# Patient Record
Sex: FEMALE | Race: BLACK OR AFRICAN AMERICAN | NOT HISPANIC OR LATINO | Employment: OTHER | URBAN - METROPOLITAN AREA
[De-identification: names, ages, dates, MRNs, and addresses within clinical notes are randomized per-mention and may not be internally consistent; named-entity substitution may affect disease eponyms.]

---

## 2018-11-27 DIAGNOSIS — R09.02 HYPOXEMIA: Primary | ICD-10-CM

## 2018-11-28 ENCOUNTER — HOSPITAL ENCOUNTER (OUTPATIENT)
Dept: RADIOLOGY | Facility: HOSPITAL | Age: 83
Discharge: HOME | End: 2018-11-28
Attending: INTERNAL MEDICINE
Payer: MEDICARE

## 2018-11-28 DIAGNOSIS — R09.02 HYPOXEMIA: ICD-10-CM

## 2018-11-28 DIAGNOSIS — R41.82 ALTERED MENTAL STATUS: Primary | ICD-10-CM

## 2018-11-28 PROCEDURE — 71046 X-RAY EXAM CHEST 2 VIEWS: CPT | Mod: 26,,, | Performed by: RADIOLOGY

## 2018-11-28 PROCEDURE — 71046 X-RAY EXAM CHEST 2 VIEWS: CPT | Mod: TC,PO

## 2018-11-30 ENCOUNTER — HOSPITAL ENCOUNTER (EMERGENCY)
Facility: HOSPITAL | Age: 83
Discharge: ANOTHER HOSPITAL | End: 2018-11-30
Attending: EMERGENCY MEDICINE
Payer: MEDICARE

## 2018-11-30 VITALS
HEART RATE: 68 BPM | DIASTOLIC BLOOD PRESSURE: 89 MMHG | WEIGHT: 203.94 LBS | RESPIRATION RATE: 20 BRPM | OXYGEN SATURATION: 100 % | SYSTOLIC BLOOD PRESSURE: 143 MMHG | TEMPERATURE: 98 F

## 2018-11-30 DIAGNOSIS — J81.0 ACUTE PULMONARY EDEMA: ICD-10-CM

## 2018-11-30 DIAGNOSIS — Z79.01 ANTICOAGULATED: ICD-10-CM

## 2018-11-30 DIAGNOSIS — E87.20 LACTIC ACIDOSIS: ICD-10-CM

## 2018-11-30 DIAGNOSIS — I50.9: Primary | ICD-10-CM

## 2018-11-30 DIAGNOSIS — Z95.2 HISTORY OF HEART VALVE REPLACEMENT WITH MECHANICAL VALVE: ICD-10-CM

## 2018-11-30 DIAGNOSIS — R41.82 ALTERED MENTAL STATUS, UNSPECIFIED ALTERED MENTAL STATUS TYPE: ICD-10-CM

## 2018-11-30 DIAGNOSIS — N17.9 AKI (ACUTE KIDNEY INJURY): ICD-10-CM

## 2018-11-30 LAB
ALBUMIN SERPL BCP-MCNC: 3.8 G/DL
ALP SERPL-CCNC: 57 U/L
ALT SERPL W/O P-5'-P-CCNC: 40 U/L
AMPHET+METHAMPHET UR QL: NEGATIVE
ANION GAP SERPL CALC-SCNC: 11 MMOL/L
AST SERPL-CCNC: 37 U/L
BACTERIA #/AREA URNS AUTO: NORMAL /HPF
BARBITURATES UR QL SCN>200 NG/ML: NEGATIVE
BASOPHILS # BLD AUTO: 0.01 K/UL
BASOPHILS NFR BLD: 0.2 %
BENZODIAZ UR QL SCN>200 NG/ML: NEGATIVE
BILIRUB SERPL-MCNC: 0.5 MG/DL
BILIRUB UR QL STRIP: NEGATIVE
BNP SERPL-MCNC: 1667 PG/ML
BUN SERPL-MCNC: 32 MG/DL
BZE UR QL SCN: NEGATIVE
CALCIUM SERPL-MCNC: 9.5 MG/DL
CANNABINOIDS UR QL SCN: NEGATIVE
CHLORIDE SERPL-SCNC: 102 MMOL/L
CLARITY UR REFRACT.AUTO: CLEAR
CO2 SERPL-SCNC: 20 MMOL/L
COLOR UR AUTO: YELLOW
CREAT SERPL-MCNC: 1.9 MG/DL
CREAT UR-MCNC: 187.8 MG/DL
DIFFERENTIAL METHOD: ABNORMAL
EOSINOPHIL # BLD AUTO: 0.1 K/UL
EOSINOPHIL NFR BLD: 1.1 %
ERYTHROCYTE [DISTWIDTH] IN BLOOD BY AUTOMATED COUNT: 18.2 %
EST. GFR  (AFRICAN AMERICAN): 26.4 ML/MIN/1.73 M^2
EST. GFR  (NON AFRICAN AMERICAN): 22.9 ML/MIN/1.73 M^2
ETHANOL SERPL-MCNC: <10 MG/DL
GLUCOSE SERPL-MCNC: 97 MG/DL
GLUCOSE UR QL STRIP: NEGATIVE
HCT VFR BLD AUTO: 33.8 %
HGB BLD-MCNC: 10.9 G/DL
HGB UR QL STRIP: NEGATIVE
HYALINE CASTS UR QL AUTO: 0 /LPF
INR PPP: 2.9
KETONES UR QL STRIP: ABNORMAL
LACTATE SERPL-SCNC: 2.4 MMOL/L
LEUKOCYTE ESTERASE UR QL STRIP: NEGATIVE
LYMPHOCYTES # BLD AUTO: 0.9 K/UL
LYMPHOCYTES NFR BLD: 15.3 %
MCH RBC QN AUTO: 28.8 PG
MCHC RBC AUTO-ENTMCNC: 32.2 G/DL
MCV RBC AUTO: 89 FL
METHADONE UR QL SCN>300 NG/ML: NEGATIVE
MICROSCOPIC COMMENT: NORMAL
MONOCYTES # BLD AUTO: 0.5 K/UL
MONOCYTES NFR BLD: 9.1 %
NEUTROPHILS # BLD AUTO: 4.2 K/UL
NEUTROPHILS NFR BLD: 74.1 %
NITRITE UR QL STRIP: NEGATIVE
OPIATES UR QL SCN: NEGATIVE
PCP UR QL SCN>25 NG/ML: NEGATIVE
PH UR STRIP: 6 [PH] (ref 5–8)
PLATELET # BLD AUTO: 227 K/UL
PMV BLD AUTO: 11.4 FL
POTASSIUM SERPL-SCNC: 4.3 MMOL/L
PROT SERPL-MCNC: 8 G/DL
PROT UR QL STRIP: ABNORMAL
PROTHROMBIN TIME: 29.4 SEC
RBC # BLD AUTO: 3.79 M/UL
RBC #/AREA URNS AUTO: 3 /HPF (ref 0–4)
SODIUM SERPL-SCNC: 133 MMOL/L
SP GR UR STRIP: >=1.03 (ref 1–1.03)
SQUAMOUS #/AREA URNS AUTO: 0 /HPF
TOXICOLOGY INFORMATION: NORMAL
TROPONIN I SERPL DL<=0.01 NG/ML-MCNC: 0.07 NG/ML
URN SPEC COLLECT METH UR: ABNORMAL
UROBILINOGEN UR STRIP-ACNC: <2 EU/DL
WBC # BLD AUTO: 5.69 K/UL
WBC #/AREA URNS AUTO: 0 /HPF (ref 0–5)

## 2018-11-30 PROCEDURE — 83605 ASSAY OF LACTIC ACID: CPT

## 2018-11-30 PROCEDURE — 80307 DRUG TEST PRSMV CHEM ANLYZR: CPT

## 2018-11-30 PROCEDURE — 87040 BLOOD CULTURE FOR BACTERIA: CPT

## 2018-11-30 PROCEDURE — 99900035 HC TECH TIME PER 15 MIN (STAT)

## 2018-11-30 PROCEDURE — 85610 PROTHROMBIN TIME: CPT

## 2018-11-30 PROCEDURE — 80053 COMPREHEN METABOLIC PANEL: CPT

## 2018-11-30 PROCEDURE — 85025 COMPLETE CBC W/AUTO DIFF WBC: CPT

## 2018-11-30 PROCEDURE — 93010 ELECTROCARDIOGRAM REPORT: CPT | Mod: ,,, | Performed by: NUCLEAR MEDICINE

## 2018-11-30 PROCEDURE — 93005 ELECTROCARDIOGRAM TRACING: CPT

## 2018-11-30 PROCEDURE — 83880 ASSAY OF NATRIURETIC PEPTIDE: CPT

## 2018-11-30 PROCEDURE — 81000 URINALYSIS NONAUTO W/SCOPE: CPT | Mod: 59

## 2018-11-30 PROCEDURE — 84484 ASSAY OF TROPONIN QUANT: CPT

## 2018-11-30 PROCEDURE — 80320 DRUG SCREEN QUANTALCOHOLS: CPT

## 2018-11-30 PROCEDURE — 99285 EMERGENCY DEPT VISIT HI MDM: CPT | Mod: 25

## 2018-11-30 RX ORDER — NITROGLYCERIN 0.4 MG/1
0.4 TABLET SUBLINGUAL EVERY 5 MIN PRN
COMMUNITY
Start: 2017-03-28

## 2018-11-30 RX ORDER — TRAMADOL HYDROCHLORIDE 50 MG/1
50 TABLET ORAL 2 TIMES DAILY PRN
Refills: 2 | COMMUNITY
Start: 2018-10-30

## 2018-12-01 NOTE — ED NOTES
"Pt is alert, denies pain anywhere & states "Im so weak", family reports decreased oral intake over past week or so  "

## 2018-12-01 NOTE — ED NOTES
Pt resting quietly in bed, she is awake, oriented to person & place , recognizes family members but is confused when asked direct questions such as what she ate today, or day of the week.  Family states pt has become more disoriented over past few days, has had an adequate appetite, but  not drinking well.  Also daughter states shes only had damp pull ups or pads 2-3 times a day over the past 4 days.   BM yesterday. Resp unlabored, rales auscultated at bases.  Daughter states that pt has been getting more SOB with exertion recently  2+ pitting edema to lower ext, feet blue. positive distal pulses.   Family states the swelling to lower legs is much better tonight than it has been.  Will continue to monitor, family at bedside

## 2018-12-01 NOTE — ED NOTES
SOB/swelling/AMS for 2 weeks, worse over past 2 days. Seen by PCP unable to obtain blood d/t dehydration. CXR clear per family    Level of Consciousness: Patient is awake, alert, and oriented to person, place, time, and situation. Speech is clear.   HEENT: Symmetrical face, PERRLA, dry mucous membranes, no congestion/drainage, no JVD, pt able to swallow   Appearance: Patient resting comfortably in bed, hygiene and clothing are both intact and appropriate.   Skin: Skin is warm, dry, and intact. darkening of skin to BLE  Musculoskeletal: Moves all extremities. Full active ROM. No deformities noted. Generalized weakness/fatigue. Pt uses walker at home but presents in wheelchair, unable to stand on own, moves to bed with full assist.  Respiratory: Airway open and patent. Resp labored with any exertion. Crackles noted to bilateral posterior lower lobes.   Cardiac: Regular rate and rhythm. Severe pitting edema noted to BLE. Pedal pulses weak.    GI: Abdomen soft, non-tender. Bowel sounds present and active in all quadrants x 4. No distention noted. Denies any nausea or vomiting.   : oliguria per pt family  Neurological: Normal sensation reported to all extremities. Symmetrical expressions noted to face. No obvious neurological deficits noted.   Psychosocial: Patient is calm and cooperative, appropriate to situation. Family is involved in patient care and at bedside.     Patient informed of plan of care, verbalizes understanding, and has no questions or concern at this time. Bed is in the lowest position and locked. Call bell within reach of the patient. Will continue to monitor.

## 2018-12-01 NOTE — ED NOTES
Per C, Patient accepted by Dr. Hyde at [facility]. [facility] setting up [facility] transport.

## 2018-12-01 NOTE — ED PROVIDER NOTES
Encounter Date: 2018       History     Chief Complaint   Patient presents with    Altered Mental Status     Onset approx 2 weeks. Worsening for past 3 days. Family reports SOB and bilateral leg swelling.      Patient currently presents accompanied by family with concerns regarding worsening alteration of mental status.  They note that the patient has had some intermittent confusion over the past 2 weeks but beginning earlier this week has gotten much worse.  The patient initially presented to her primary care physician on Monday but notes that they were unable to acquire blood for lab work.  They note that the patient was sent to a separate facility on Tuesday but again were unable to acquire labs.  They note that the patient has been fairly withdrawn and has been increasingly confused.  They note that the patient has been discussing family members that have been  for years.  They deny fever or chills. They have noted that the patient appears to be short of breath and also has significant swelling in the bilateral lower extremities along with some discoloration in the feet.  Patient has a longstanding history of diabetes and hypertension as well as CHF.  She has a history of a mechanical valve replacement for which she is currently anticoagulated.  It was also noted earlier this week that the patient was in newly acquired atrial fibrillation.          Review of patient's allergies indicates:  No Known Allergies  Past Medical History:   Diagnosis Date    CHF (congestive heart failure)     Diabetes mellitus     Hypertension     Thyroid disease      Past Surgical History:   Procedure Laterality Date    CARDIAC VALVE REPLACEMENT      HERNIA REPAIR       History reviewed. No pertinent family history.  Social History     Tobacco Use    Smoking status: Never Smoker    Smokeless tobacco: Never Used   Substance Use Topics    Alcohol use: No    Drug use: No     Review of Systems   Constitutional:  Positive for fatigue. Negative for chills and fever.   HENT: Negative for congestion and rhinorrhea.    Respiratory: Positive for cough, shortness of breath and wheezing. Negative for chest tightness.    Cardiovascular: Positive for leg swelling. Negative for chest pain and palpitations.   Gastrointestinal: Negative for abdominal pain, constipation, diarrhea, nausea and vomiting.   Genitourinary: Negative for dysuria, frequency, urgency, vaginal bleeding and vaginal discharge.   Skin: Negative for color change and rash.   Allergic/Immunologic: Negative for immunocompromised state.   Neurological: Negative for dizziness, weakness and numbness.   Hematological: Negative for adenopathy. Does not bruise/bleed easily.   Psychiatric/Behavioral: Positive for confusion.   All other systems reviewed and are negative.      Physical Exam     Initial Vitals [11/30/18 1752]   BP Pulse Resp Temp SpO2   (!) 164/79 77 20 98 °F (36.7 °C) 96 %      MAP       --         Vitals:    11/30/18 1752 11/30/18 1815 11/30/18 1926 11/30/18 1946   BP: (!) 164/79  129/89 (!) 154/81   Pulse: 77 63 64 63   Resp: 20  (!) 28 (!) 25   Temp: 98 °F (36.7 °C)      TempSrc: Oral      SpO2: 96%   (!) 94%   Weight: 92.5 kg (203 lb 14.8 oz)       11/30/18 2016   BP: (!) 146/92   Pulse: 62   Resp: (!) 24   Temp:    TempSrc:    SpO2:    Weight:      Physical Exam    Nursing note and vitals reviewed.  Constitutional: She appears well-developed and well-nourished. She is not diaphoretic. No distress.   HENT:   Head: Normocephalic and atraumatic.   Right Ear: External ear normal.   Left Ear: External ear normal.   Nose: Nose normal.   Mouth/Throat: Oropharynx is clear and moist.   Eyes: Conjunctivae and EOM are normal. Pupils are equal, round, and reactive to light. No scleral icterus.   Neck: Neck supple. No tracheal deviation present. No JVD present.   Cardiovascular: Normal rate, regular rhythm, normal heart sounds and intact distal pulses. Exam reveals no  gallop and no friction rub.    No murmur heard.  Pulmonary/Chest: No accessory muscle usage. Tachypnea noted. No respiratory distress. She has wheezes. She has no rhonchi. She has rales.   Abdominal: Soft. Bowel sounds are normal. She exhibits no distension. There is no tenderness.   Musculoskeletal: Normal range of motion. She exhibits edema.   Bluish discoloration noted to the distal feet bilaterally. There is also marked bilateral lower extremity swelling with pitting edema.   Neurological: She is alert and oriented to person, place, and time. She has normal strength. No cranial nerve deficit or sensory deficit.   Skin: Skin is warm and dry. No rash noted.   Psychiatric: She has a normal mood and affect. Her behavior is normal.       ED Course   Procedures  Labs Reviewed   CBC W/ AUTO DIFFERENTIAL - Abnormal; Notable for the following components:       Result Value    RBC 3.79 (*)     Hemoglobin 10.9 (*)     Hematocrit 33.8 (*)     RDW 18.2 (*)     Lymph # 0.9 (*)     Gran% 74.1 (*)     Lymph% 15.3 (*)     All other components within normal limits   COMPREHENSIVE METABOLIC PANEL - Abnormal; Notable for the following components:    Sodium 133 (*)     CO2 20 (*)     BUN, Bld 32 (*)     Creatinine 1.9 (*)     eGFR if  26.4 (*)     eGFR if non  22.9 (*)     All other components within normal limits   TROPONIN I - Abnormal; Notable for the following components:    Troponin I 0.067 (*)     All other components within normal limits   URINALYSIS, REFLEX TO URINE CULTURE - Abnormal; Notable for the following components:    Specific Gravity, UA >=1.030 (*)     Protein, UA 2+ (*)     Ketones, UA Trace (*)     All other components within normal limits    Narrative:     Preferred Collection Type->Urine, Clean Catch   LACTIC ACID, PLASMA - Abnormal; Notable for the following components:    Lactate (Lactic Acid) 2.4 (*)     All other components within normal limits   B-TYPE NATRIURETIC PEPTIDE -  Abnormal; Notable for the following components:    BNP 1,667 (*)     All other components within normal limits   CULTURE, BLOOD   ALCOHOL,MEDICAL (ETHANOL)   DRUG SCREEN PANEL, URINE EMERGENCY    Narrative:     Preferred Collection Type->Urine, Clean Catch   URINALYSIS MICROSCOPIC    Narrative:     Preferred Collection Type->Urine, Clean Catch   PROTIME-INR     EKG Readings: (Independently Interpreted)   Initial Reading: No STEMI. Rhythm: Atrial Fibrillation. Heart Rate: 72. Ectopy: No Ectopy. Axis: Left Axis Deviation.       Imaging Results          CT Head Without Contrast (Final result)  Result time 11/30/18 19:14:45    Final result by Ravi Holley III, MD (11/30/18 19:14:45)                 Impression:      Scan degraded by motion.  Atrophy with bilateral white-matter changes and scattered lacuna which may be related to microvascular disease.  No bleed or other gross acute abnormality suggested.    All CT scans at this facility are performed  using dose modulation techniques as appropriate to performed exam including the following:  automated exposure control; adjustment of mA and/or kV according to the patients size (this includes techniques or standardized protocols for targeted exams where dose is matched to indication/reason for exam: i.e. extremities or head);  iterative reconstruction technique.      Electronically signed by: Ravi Holley MD  Date:    11/30/2018  Time:    19:14             Narrative:    EXAMINATION:  CT HEAD WITHOUT CONTRAST    CLINICAL HISTORY:  Confusion/delirium, altered LOC, unexplained;    TECHNIQUE:  Standard non contrast CT scan of the brain.    COMPARISON:  None    FINDINGS:  There are moderate changes of atrophy, both supra and infratentorial.  Changes of low attenuation are noted in the periventricular and subcortical white matter with scattered lacuna.  Exam is mildly degraded by motion artifact.  There is no hemorrhage, mass lesion, hydrocephalus, or midline shift.   No acute/depressed skull fracture.  Mucosal thickening noted within the left maxillary antrum.                               X-Ray Chest AP Portable (Final result)  Result time 11/30/18 19:11:57    Final result by Ravi Holley III, MD (11/30/18 19:11:57)                 Impression:      Postop changes with moderate cardiomegaly.  Cannot exclude pleural thickening versus loculated fluid laterally toward the right base.  Follow-up recommended      Electronically signed by: Ravi Holley MD  Date:    11/30/2018  Time:    19:11             Narrative:    EXAMINATION:  XR CHEST AP PORTABLE    CLINICAL HISTORY:  AMS;    COMPARISON:  November 28th    FINDINGS:  Heart size is moderately enlarged with postoperative changes along the sternum.  I question slight thickening along the lower lateral chest wall on the right which may be due to a small fluid collection.  Lungs otherwise clear.                                 Medical Decision Making:   ED Management:  All historical, clinical, radiographic, and laboratory findings were reviewed with the patient/family in detail along with the indications for transfer to an outside facility (rather than admission to our facility in [place]) secondary to patient preference for continuity of care with Dr Pizarro and a need for cardiology consultation and echo given the diagnosis of low output failure.  All remaining questions and concerns were addressed at that time and the patient/family communicates understanding and agrees to proceed accordingly.  Similarly all pertinent details of the encounter were discussed with Dr Hyde at [facility] ED via [facility] who agrees to accept the patient in transfer based on the needs/patient preferences outlined above.  Patient will be transferred by [facility] secondary to a need for ongoing cardiac monitoring en route.  Jaquan Min MD  8:58 PM                          Clinical Impression:   The primary encounter  diagnosis was Low output HF (heart failure). Diagnoses of Acute pulmonary edema, JET (acute kidney injury), Altered mental status, unspecified altered mental status type, History of heart valve replacement with mechanical valve, Anticoagulated, and Lactic acidosis were also pertinent to this visit.                             Jaquan Min MD  11/30/18 0510

## 2018-12-06 LAB — BACTERIA BLD CULT: NORMAL

## 2019-02-16 ENCOUNTER — HOSPITAL ENCOUNTER (EMERGENCY)
Facility: HOSPITAL | Age: 84
Discharge: HOME | End: 2019-02-17
Attending: EMERGENCY MEDICINE
Payer: MEDICARE

## 2019-02-16 DIAGNOSIS — N28.9 RENAL DYSFUNCTION: Primary | ICD-10-CM

## 2019-02-16 DIAGNOSIS — N39.0 URINARY TRACT INFECTION WITHOUT HEMATURIA, SITE UNSPECIFIED: ICD-10-CM

## 2019-02-16 DIAGNOSIS — R41.0 CONFUSION: ICD-10-CM

## 2019-02-16 DIAGNOSIS — D64.9 ANEMIA, UNSPECIFIED TYPE: ICD-10-CM

## 2019-02-16 PROCEDURE — 99900035 HC TECH TIME PER 15 MIN (STAT): Mod: ER

## 2019-02-16 PROCEDURE — 99285 EMERGENCY DEPT VISIT HI MDM: CPT | Mod: 25,ER

## 2019-02-16 PROCEDURE — 93010 EKG 12-LEAD: ICD-10-PCS | Mod: ,,, | Performed by: NUCLEAR MEDICINE

## 2019-02-16 PROCEDURE — 93005 ELECTROCARDIOGRAM TRACING: CPT | Mod: ER

## 2019-02-16 PROCEDURE — 93010 ELECTROCARDIOGRAM REPORT: CPT | Mod: ,,, | Performed by: NUCLEAR MEDICINE

## 2019-02-16 RX ORDER — FUROSEMIDE 40 MG/1
40 TABLET ORAL 2 TIMES DAILY
COMMUNITY

## 2019-02-17 VITALS
OXYGEN SATURATION: 97 % | DIASTOLIC BLOOD PRESSURE: 77 MMHG | TEMPERATURE: 97 F | HEART RATE: 69 BPM | RESPIRATION RATE: 20 BRPM | SYSTOLIC BLOOD PRESSURE: 147 MMHG

## 2019-02-17 PROBLEM — N39.0 URINARY TRACT INFECTION WITHOUT HEMATURIA: Status: ACTIVE | Noted: 2019-02-17

## 2019-02-17 PROBLEM — R41.0 CONFUSION: Status: ACTIVE | Noted: 2019-02-17

## 2019-02-17 PROBLEM — N28.9 RENAL DYSFUNCTION: Status: ACTIVE | Noted: 2019-02-17

## 2019-02-17 LAB
ALBUMIN SERPL BCP-MCNC: 3.1 G/DL
ALP SERPL-CCNC: 83 U/L
ALT SERPL W/O P-5'-P-CCNC: 19 U/L
AMPHET+METHAMPHET UR QL: NEGATIVE
ANION GAP SERPL CALC-SCNC: 12 MMOL/L
AST SERPL-CCNC: 29 U/L
BACTERIA #/AREA URNS AUTO: ABNORMAL /HPF
BARBITURATES UR QL SCN>200 NG/ML: NEGATIVE
BASOPHILS # BLD AUTO: 0.02 K/UL
BASOPHILS NFR BLD: 0.4 %
BENZODIAZ UR QL SCN>200 NG/ML: NEGATIVE
BILIRUB SERPL-MCNC: 0.9 MG/DL
BILIRUB UR QL STRIP: NEGATIVE
BUN SERPL-MCNC: 38 MG/DL
BZE UR QL SCN: NEGATIVE
CALCIUM SERPL-MCNC: 9.2 MG/DL
CANNABINOIDS UR QL SCN: NEGATIVE
CHLORIDE SERPL-SCNC: 104 MMOL/L
CLARITY UR REFRACT.AUTO: CLEAR
CO2 SERPL-SCNC: 22 MMOL/L
COLOR UR AUTO: ABNORMAL
CREAT SERPL-MCNC: 1.8 MG/DL
CREAT UR-MCNC: 81.5 MG/DL
DIFFERENTIAL METHOD: ABNORMAL
EOSINOPHIL # BLD AUTO: 0 K/UL
EOSINOPHIL NFR BLD: 0.2 %
ERYTHROCYTE [DISTWIDTH] IN BLOOD BY AUTOMATED COUNT: 20.4 %
EST. GFR  (AFRICAN AMERICAN): 28.2 ML/MIN/1.73 M^2
EST. GFR  (NON AFRICAN AMERICAN): 24.4 ML/MIN/1.73 M^2
GLUCOSE SERPL-MCNC: 122 MG/DL
GLUCOSE UR QL STRIP: NEGATIVE
HCT VFR BLD AUTO: 31.3 %
HGB BLD-MCNC: 9.9 G/DL
HGB UR QL STRIP: ABNORMAL
HYALINE CASTS UR QL AUTO: 0 /LPF
INR PPP: 2.6
KETONES UR QL STRIP: NEGATIVE
LEUKOCYTE ESTERASE UR QL STRIP: ABNORMAL
LYMPHOCYTES # BLD AUTO: 0.9 K/UL
LYMPHOCYTES NFR BLD: 17.7 %
MCH RBC QN AUTO: 28.2 PG
MCHC RBC AUTO-ENTMCNC: 31.6 G/DL
MCV RBC AUTO: 89 FL
METHADONE UR QL SCN>300 NG/ML: NEGATIVE
MICROSCOPIC COMMENT: ABNORMAL
MONOCYTES # BLD AUTO: 0.5 K/UL
MONOCYTES NFR BLD: 10.6 %
NEUTROPHILS # BLD AUTO: 3.5 K/UL
NEUTROPHILS NFR BLD: 70.9 %
NITRITE UR QL STRIP: NEGATIVE
OPIATES UR QL SCN: NEGATIVE
PCP UR QL SCN>25 NG/ML: NEGATIVE
PH UR STRIP: 6 [PH] (ref 5–8)
PLATELET # BLD AUTO: 167 K/UL
PMV BLD AUTO: 12.1 FL
POTASSIUM SERPL-SCNC: 4.9 MMOL/L
PROT SERPL-MCNC: 7.2 G/DL
PROT UR QL STRIP: ABNORMAL
PROTHROMBIN TIME: 26.5 SEC
RBC # BLD AUTO: 3.51 M/UL
RBC #/AREA URNS AUTO: 1 /HPF (ref 0–4)
SODIUM SERPL-SCNC: 138 MMOL/L
SP GR UR STRIP: 1.02 (ref 1–1.03)
SQUAMOUS #/AREA URNS AUTO: 2 /HPF
TOXICOLOGY INFORMATION: NORMAL
TROPONIN I SERPL DL<=0.01 NG/ML-MCNC: 0.04 NG/ML
TROPONIN I SERPL DL<=0.01 NG/ML-MCNC: 0.05 NG/ML
URN SPEC COLLECT METH UR: ABNORMAL
UROBILINOGEN UR STRIP-ACNC: <2 EU/DL
WBC # BLD AUTO: 4.92 K/UL
WBC #/AREA URNS AUTO: 50 /HPF (ref 0–5)

## 2019-02-17 PROCEDURE — P9612 CATHETERIZE FOR URINE SPEC: HCPCS | Mod: ER

## 2019-02-17 PROCEDURE — 80053 COMPREHEN METABOLIC PANEL: CPT | Mod: ER

## 2019-02-17 PROCEDURE — 87186 SC STD MICRODIL/AGAR DIL: CPT

## 2019-02-17 PROCEDURE — 85025 COMPLETE CBC W/AUTO DIFF WBC: CPT | Mod: ER

## 2019-02-17 PROCEDURE — 81000 URINALYSIS NONAUTO W/SCOPE: CPT | Mod: 59,ER

## 2019-02-17 PROCEDURE — 25000003 PHARM REV CODE 250: Mod: ER | Performed by: EMERGENCY MEDICINE

## 2019-02-17 PROCEDURE — 87088 URINE BACTERIA CULTURE: CPT

## 2019-02-17 PROCEDURE — 87086 URINE CULTURE/COLONY COUNT: CPT

## 2019-02-17 PROCEDURE — 87077 CULTURE AEROBIC IDENTIFY: CPT

## 2019-02-17 PROCEDURE — 96360 HYDRATION IV INFUSION INIT: CPT | Mod: ER

## 2019-02-17 PROCEDURE — 84484 ASSAY OF TROPONIN QUANT: CPT | Mod: ER

## 2019-02-17 PROCEDURE — 80307 DRUG TEST PRSMV CHEM ANLYZR: CPT | Mod: ER

## 2019-02-17 PROCEDURE — 85610 PROTHROMBIN TIME: CPT | Mod: ER

## 2019-02-17 PROCEDURE — 84484 ASSAY OF TROPONIN QUANT: CPT | Mod: 91,ER

## 2019-02-17 RX ORDER — NITROFURANTOIN 25; 75 MG/1; MG/1
100 CAPSULE ORAL
Status: COMPLETED | OUTPATIENT
Start: 2019-02-17 | End: 2019-02-17

## 2019-02-17 RX ORDER — NITROFURANTOIN 25; 75 MG/1; MG/1
100 CAPSULE ORAL 2 TIMES DAILY
Qty: 10 CAPSULE | Refills: 0 | Status: SHIPPED | OUTPATIENT
Start: 2019-02-17 | End: 2019-02-22

## 2019-02-17 RX ADMIN — NITROFURANTOIN (MONOHYDRATE/MACROCRYSTALS) 100 MG: 75; 25 CAPSULE ORAL at 02:02

## 2019-02-17 RX ADMIN — SODIUM CHLORIDE 500 ML: 0.9 INJECTION, SOLUTION INTRAVENOUS at 12:02

## 2019-02-17 NOTE — DISCHARGE INSTRUCTIONS
For URINARY TRACT INFECTION, I instructed patient to avoid holding in urine and recommended that she urinate when she feels the urge.  Also advised patient to drink plenty of liquids and reminded patient that she may need to drink more fluids than usual to help flush out the bacteria. Instructed patient to avoid alcohol, caffeine, and citrus juices as these substances can irritate your bladder and increase your symptoms.  Also recommended that patient apply heat to lower abdomen for 20 to 30 minutes every two hours to help decrease discomfort and pressure in the bladder.    Regarding ANEMIA, I discussed with patient the signs and symptoms related to anemia such as paleness, fatigue, tachycardia, cold hands and feet, brittle nails, headaches, and SOB. Encouraged patient to eat foods high in iron (liver and other meats; seafood; dried fruits, nuts; beans; green leafy vegetables; whole grains; and iron-fortified breads and cereals), take iron supplements with food, increase fiber intake, and take supplement early in day. Advised patient to notify primary care provider of any bothersome side effects (heartburn, constipation, abdominal pain).     Regarding HYPERTENSION, I advised patient to: keep a record of blood pressure results; take your blood pressure medication exactly as directed without skipping doses; avoid medications that contain heart stimulants, including over-the-counter drugs such as decongestants; maintain a healthy weight; cut back on sodium intake (i.e., limit canned, dried, packaged, and fast foods and don?t add salt to food); follow the DASH (Dietary Approaches to Stop Hypertension) eating plan which recommends vegetables, fruits, whole gains, and other heart healthy foods; begin an exercise program that includes  aerobic exercise 3 to 4 times a week for an average of 40 minutes at a time (with approval of cardiologist or primary care provider); limit drinks that contain alcohol and caffeine; control  levels of emotional stress; and seek emergency care for any shortness of breath, chest pain, difficulty speaking, confusion, or visual changes.

## 2019-02-17 NOTE — ED PROVIDER NOTES
Encounter Date: 2/16/2019       History     Chief Complaint   Patient presents with    Hypertension     Pt family members say pt blood pressure was 180/103, pt family gave patient an extra metoprolol at home.      The history is provided by the patient and a relative.   Illness    The current episode started yesterday (after taking ultram.  daughter states this happens often after pt takes her pain medicine.  pt origianlly here for htn.  this has improved.). The problem occurs occasionally. The problem has been unchanged. The pain is at a severity of 0/10. Nothing relieves the symptoms. Nothing aggravates the symptoms. Pertinent negatives include no fever, no decreased vision, no double vision, no eye itching, no photophobia, no abdominal pain, no constipation, no diarrhea, no nausea, no vomiting, no congestion, no ear discharge, no ear pain, no headaches, no hearing loss, no mouth sores, no rhinorrhea, no sore throat, no stridor, no swollen glands, no muscle aches, no neck pain, no neck stiffness, no cough, no shortness of breath, no URI, no wheezing, no rash, no discharge, no pain and no eye redness. She has received no recent medical care.     Review of patient's allergies indicates:  No Known Allergies  Past Medical History:   Diagnosis Date    CHF (congestive heart failure)     Diabetes mellitus     Hypertension     Thyroid disease      Past Surgical History:   Procedure Laterality Date    CARDIAC VALVE REPLACEMENT      HERNIA REPAIR       History reviewed. No pertinent family history.  Social History     Tobacco Use    Smoking status: Never Smoker    Smokeless tobacco: Never Used   Substance Use Topics    Alcohol use: No    Drug use: No     Review of Systems   Constitutional: Negative for fever.   HENT: Negative for congestion, ear discharge, ear pain, hearing loss, mouth sores, rhinorrhea and sore throat.    Eyes: Negative for double vision, photophobia, pain, discharge, redness and itching.    Respiratory: Negative for cough, shortness of breath, wheezing and stridor.    Cardiovascular: Negative for chest pain.   Gastrointestinal: Negative for abdominal pain, constipation, diarrhea, nausea and vomiting.   Genitourinary: Negative for dysuria.   Musculoskeletal: Negative for back pain and neck pain.   Skin: Negative for rash.   Neurological: Negative for dizziness, tremors, seizures, syncope, facial asymmetry, speech difficulty, weakness, light-headedness, numbness and headaches.   Hematological: Does not bruise/bleed easily.   Psychiatric/Behavioral: Positive for confusion (oriented to person only.) and decreased concentration. Negative for agitation, behavioral problems, dysphoric mood, hallucinations, self-injury, sleep disturbance and suicidal ideas. The patient is not nervous/anxious and is not hyperactive.    All other systems reviewed and are negative.      Physical Exam     Initial Vitals [02/16/19 2206]   BP Pulse Resp Temp SpO2   (!) 140/73 63 20 97.3 °F (36.3 °C) 98 %      MAP       --         Physical Exam    Nursing note and vitals reviewed.  Constitutional: She appears well-developed and well-nourished.   HENT:   Head: Normocephalic and atraumatic.   Mouth/Throat: No oropharyngeal exudate.   Eyes: Conjunctivae and EOM are normal. Pupils are equal, round, and reactive to light.   Neck: Normal range of motion. Neck supple. No thyromegaly present.   Cardiovascular: Normal rate, regular rhythm, normal heart sounds and intact distal pulses. Exam reveals no gallop and no friction rub.    No murmur heard.  Pulmonary/Chest: Breath sounds normal. No respiratory distress. She has no wheezes. She has no rhonchi. She exhibits no tenderness.   Abdominal: Soft. Bowel sounds are normal. She exhibits no distension. There is no tenderness. There is no rebound and no guarding.   Musculoskeletal: Normal range of motion. She exhibits no edema or tenderness.   Lymphadenopathy:     She has no cervical adenopathy.    Neurological: She is alert. She has normal strength. She is disoriented (family states this is not far from her baseline since CVA several months ago.). No cranial nerve deficit or sensory deficit. GCS eye subscore is 4. GCS verbal subscore is 5. GCS motor subscore is 6.   Skin: Skin is warm and dry. No rash noted.   Psychiatric: She has a normal mood and affect. Her speech is normal. Judgment and thought content normal. Her mood appears not anxious. Her affect is not angry. She is slowed. She is not agitated, not aggressive, not hyperactive, not withdrawn, not actively hallucinating and not combative. Thought content is not paranoid and not delusional. She does not exhibit a depressed mood. She expresses no homicidal and no suicidal ideation. She expresses no suicidal plans and no homicidal plans. She exhibits abnormal recent memory.   Pt confused about where she is, but once given cues, pt able to expand on these cues.         ED Course   Procedures  Labs Reviewed   CBC W/ AUTO DIFFERENTIAL - Abnormal; Notable for the following components:       Result Value    RBC 3.51 (*)     Hemoglobin 9.9 (*)     Hematocrit 31.3 (*)     MCHC 31.6 (*)     RDW 20.4 (*)     Lymph # 0.9 (*)     Lymph% 17.7 (*)     All other components within normal limits   COMPREHENSIVE METABOLIC PANEL - Abnormal; Notable for the following components:    CO2 22 (*)     Glucose 122 (*)     BUN, Bld 38 (*)     Creatinine 1.8 (*)     Albumin 3.1 (*)     eGFR if  28.2 (*)     eGFR if non  24.4 (*)     All other components within normal limits   TROPONIN I - Abnormal; Notable for the following components:    Troponin I 0.046 (*)     All other components within normal limits   URINALYSIS, REFLEX TO URINE CULTURE - Abnormal; Notable for the following components:    Protein, UA 1+ (*)     Occult Blood UA Trace (*)     Leukocytes, UA 2+ (*)     All other components within normal limits    Narrative:     Preferred  Collection Type->Urine, Clean Catch   TROPONIN I - Abnormal; Notable for the following components:    Troponin I 0.039 (*)     All other components within normal limits   PROTIME-INR - Abnormal; Notable for the following components:    Prothrombin Time 26.5 (*)     INR 2.6 (*)     All other components within normal limits   URINALYSIS MICROSCOPIC - Abnormal; Notable for the following components:    WBC, UA 50 (*)     Bacteria, UA Many (*)     All other components within normal limits    Narrative:     Preferred Collection Type->Urine, Clean Catch   CULTURE, URINE   DRUG SCREEN PANEL, URINE EMERGENCY    Narrative:     Preferred Collection Type->Urine, Clean Catch   PROTIME-INR     EKG Readings: (Independently Interpreted)   Initial Reading: No STEMI. Rhythm: Atrial Flutter. Heart Rate: 62. Ectopy: No Ectopy. Conduction: LBBB. ST Segments: Normal ST Segments. T Waves: Normal. Axis: Left Axis Deviation. Clinical Impression: Atrial Flutter with LBBB       Imaging Results          X-Ray Chest AP Portable (Preliminary result)  Result time 02/17/19 02:38:54    ED Interpretation by Sukhdev Estrada Jr., MD (02/17/19 02:38:54, [facility], Emergency Medicine)    Cardiomegaly.  naf                             CT Head Without Contrast (Final result)  Result time 02/16/19 23:57:51    Final result by Lionel Tubbs MD (02/16/19 23:57:51)                 Impression:      No CT evidence of acute intracranial abnormality.    All CT scans at this facility are performed  using dose modulation techniques as appropriate to performed exam including the following:  automated exposure control; adjustment of mA and/or kV according to the patients size (this includes techniques or standardized protocols for targeted exams where dose is matched to indication/reason for exam: i.e. extremities or head);  iterative reconstruction technique.      Electronically signed by: Lionel Tubbs MD  Date:    02/16/2019  Time:    23:57              Narrative:    EXAMINATION:  CT HEAD WITHOUT CONTRAST    CLINICAL HISTORY:  Disorientation, unspecifiedConfusion/delirium, altered LOC, unexplained;    TECHNIQUE:  Axial CT imaging was performed through the head without intravenous contrast.    COMPARISON:  01/30/2018    FINDINGS:  Age-related chronic deep white matter microangiopathy and cerebral atrophy. No hydrocephalus, midline shift, mass effect, or acute intracranial hemorrhage. Cortical sulcal pattern and gray-white matter differentiation is normal. Basilar cisterns and posterior fossa are normal.  Chronic left maxillary sinus mucosal thickening.  Bilateral antrostomies.  The skull is intact.                                    Vitals:    02/16/19 2206 02/17/19 0158   BP: (!) 140/73    Pulse: 63 77   Resp: 20    Temp: 97.3 °F (36.3 °C)    TempSrc: Oral    SpO2: 98% 95%       Results for orders placed or performed during the hospital encounter of 02/16/19   CBC auto differential   Result Value Ref Range    WBC 4.92 3.90 - 12.70 K/uL    RBC 3.51 (L) 4.00 - 5.40 M/uL    Hemoglobin 9.9 (L) 12.0 - 16.0 g/dL    Hematocrit 31.3 (L) 37.0 - 48.5 %    MCV 89 82 - 98 fL    MCH 28.2 27.0 - 31.0 pg    MCHC 31.6 (L) 32.0 - 36.0 g/dL    RDW 20.4 (H) 11.5 - 14.5 %    Platelets 167 150 - 350 K/uL    MPV 12.1 9.2 - 12.9 fL    Gran # (ANC) 3.5 1.8 - 7.7 K/uL    Lymph # 0.9 (L) 1.0 - 4.8 K/uL    Mono # 0.5 0.3 - 1.0 K/uL    Eos # 0.0 0.0 - 0.5 K/uL    Baso # 0.02 0.00 - 0.20 K/uL    Gran% 70.9 38.0 - 73.0 %    Lymph% 17.7 (L) 18.0 - 48.0 %    Mono% 10.6 4.0 - 15.0 %    Eosinophil% 0.2 0.0 - 8.0 %    Basophil% 0.4 0.0 - 1.9 %    Differential Method Automated    Comprehensive metabolic panel   Result Value Ref Range    Sodium 138 136 - 145 mmol/L    Potassium 4.9 3.5 - 5.1 mmol/L    Chloride 104 95 - 110 mmol/L    CO2 22 (L) 23 - 29 mmol/L    Glucose 122 (H) 70 - 110 mg/dL    BUN, Bld 38 (H) 8 - 23 mg/dL    Creatinine 1.8 (H) 0.5 - 1.4 mg/dL    Calcium 9.2 8.7 - 10.5 mg/dL     Total Protein 7.2 6.0 - 8.4 g/dL    Albumin 3.1 (L) 3.5 - 5.2 g/dL    Total Bilirubin 0.9 0.1 - 1.0 mg/dL    Alkaline Phosphatase 83 55 - 135 U/L    AST 29 10 - 40 U/L    ALT 19 10 - 44 U/L    Anion Gap 12 8 - 16 mmol/L    eGFR if African American 28.2 (A) >60 mL/min/1.73 m^2    eGFR if non  24.4 (A) >60 mL/min/1.73 m^2   Troponin I #1   Result Value Ref Range    Troponin I 0.046 (H) 0.000 - 0.026 ng/mL   Urinalysis, Reflex to Urine Culture Urine, Clean Catch   Result Value Ref Range    Specimen UA Urine, Catheterized     Color, UA King[place] Yellow, Straw, Kinga    Appearance, UA Clear Clear    pH, UA 6.0 5.0 - 8.0    Specific Gravity, UA 1.020 1.005 - 1.030    Protein, UA 1+ (A) Negative    Glucose, UA Negative Negative    Ketones, UA Negative Negative    Bilirubin (UA) Negative Negative    Occult Blood UA Trace (A) Negative    Nitrite, UA Negative Negative    Urobilinogen, UA <2.0 <2.0 EU/dL    Leukocytes, UA 2+ (A) Negative   Drug screen panel, emergency   Result Value Ref Range    Benzodiazepines Negative     Methadone metabolites Negative     Cocaine (Metab.) Negative     Opiate Scrn, Ur Negative     Barbiturate Screen, Ur Negative     Amphetamine Screen, Ur Negative     THC Negative     Phencyclidine Negative     Creatinine, Random Ur 81.5 15.0 - 325.0 mg/dL    Toxicology Information SEE COMMENT    Troponin I #2   Result Value Ref Range    Troponin I 0.039 (H) 0.000 - 0.026 ng/mL   Protime-INR   Result Value Ref Range    Prothrombin Time 26.5 (H) 9.0 - 12.5 sec    INR 2.6 (H) 0.8 - 1.2   Urinalysis Microscopic   Result Value Ref Range    RBC, UA 1 0 - 4 /hpf    WBC, UA 50 (H) 0 - 5 /hpf    Bacteria, UA Many (A) None-Occ /hpf    Squam Epithel, UA 2 /hpf    Hyaline Casts, UA 0 0-1/lpf /lpf    Microscopic Comment SEE COMMENT          Imaging Results          X-Ray Chest AP Portable (Preliminary result)  Result time 02/17/19 02:38:54    ED Interpretation by Sukhdev Estrada Jr., MD (02/17/19 02:38:54,  [facility], Emergency Medicine)    Cardiomegaly.  naf                             CT Head Without Contrast (Final result)  Result time 02/16/19 23:57:51    Final result by Lionel Tubbs MD (02/16/19 23:57:51)                 Impression:      No CT evidence of acute intracranial abnormality.    All CT scans at this facility are performed  using dose modulation techniques as appropriate to performed exam including the following:  automated exposure control; adjustment of mA and/or kV according to the patients size (this includes techniques or standardized protocols for targeted exams where dose is matched to indication/reason for exam: i.e. extremities or head);  iterative reconstruction technique.      Electronically signed by: Lionel Tubbs MD  Date:    02/16/2019  Time:    23:57             Narrative:    EXAMINATION:  CT HEAD WITHOUT CONTRAST    CLINICAL HISTORY:  Disorientation, unspecifiedConfusion/delirium, altered LOC, unexplained;    TECHNIQUE:  Axial CT imaging was performed through the head without intravenous contrast.    COMPARISON:  01/30/2018    FINDINGS:  Age-related chronic deep white matter microangiopathy and cerebral atrophy. No hydrocephalus, midline shift, mass effect, or acute intracranial hemorrhage. Cortical sulcal pattern and gray-white matter differentiation is normal. Basilar cisterns and posterior fossa are normal.  Chronic left maxillary sinus mucosal thickening.  Bilateral antrostomies.  The skull is intact.                                Medications   sodium chloride 0.9% bolus 500 mL (0 mLs Intravenous Stopped 2/17/19 0130)   nitrofurantoin (macrocrystal-monohydrate) 100 MG capsule 100 mg (100 mg Oral Given 2/17/19 0239)       2:52 AM - Re-evaluation: The patient is resting comfortably and is in no acute distress. She states that her symptoms have improved after treatment within ER. Discussed test results, shared treatment plan, specific conditions for return, and  importance of follow up with patient and family.  She understands and agrees with the plan as discussed. Answered  her questions at this time. She has remained hemodynamically stable throughout the ED course and is appropriate for discharge home.     For URINARY TRACT INFECTION, I instructed patient to avoid holding in urine and recommended that she urinate when she feels the urge.  Also advised patient to drink plenty of liquids and reminded patient that she may need to drink more fluids than usual to help flush out the bacteria. Instructed patient to avoid alcohol, caffeine, and citrus juices as these substances can irritate your bladder and increase your symptoms.  Also recommended that patient apply heat to lower abdomen for 20 to 30 minutes every two hours to help decrease discomfort and pressure in the bladder.    Regarding HYPERTENSION, I advised patient to: keep a record of blood pressure results; take your blood pressure medication exactly as directed without skipping doses; avoid medications that contain heart stimulants, including over-the-counter drugs such as decongestants; maintain a healthy weight; cut back on sodium intake (i.e., limit canned, dried, packaged, and fast foods and dont add salt to food); follow the DASH (Dietary Approaches to Stop Hypertension) eating plan which recommends vegetables, fruits, whole gains, and other heart healthy foods; begin an exercise program that includes  aerobic exercise 3 to 4 times a week for an average of 40 minutes at a time (with approval of cardiologist or primary care provider); limit drinks that contain alcohol and caffeine; control levels of emotional stress; and seek emergency care for any shortness of breath, chest pain, difficulty speaking, confusion, or visual changes.      Regarding ANEMIA, I discussed with patient the signs and symptoms related to anemia such as paleness, fatigue, tachycardia, cold hands and feet, brittle nails, headaches, and SOB.  Encouraged patient to eat foods high in iron (liver and other meats; seafood; dried fruits, nuts; beans; green leafy vegetables; whole grains; and iron-fortified breads and cereals), take iron supplements with food, increase fiber intake, and take supplement early in day. Advised patient to notify primary care provider of any bothersome side effects (heartburn, constipation, abdominal pain).     Pre-hypertension/Hypertension: The pt has been informed that they may have pre-hypertension or hypertension based on a blood pressure reading in the ED. I recommend that the pt call the PCP listed on their discharge instructions or a physician of their choice this week to arrange f/u for further evaluation of possible pre-hypertension or hypertension.     Sarika Harris was given a handout which discussed their disease process, precautions, and instructions for follow-up and therapy.    Follow-up Information     Sumit Paiz MD. Schedule an appointment as soon as possible for a visit in 1 week.    Specialty:  Internal Medicine  Contact information:  [address] [facility] [address]  [phone]             [facility].    Specialty:  Emergency Medicine  Why:  As needed, If symptoms worsen  Contact information:  [address] [address] [address] [phone]                    Medication List      START taking these medications    nitrofurantoin (macrocrystal-monohydrate) 100 MG capsule  Commonly known as:  MACROBID  Take 1 capsule (100 mg total) by mouth 2 (two) times daily. for 5 days        ASK your doctor about these medications    furosemide 40 MG tablet  Commonly known as:  LASIX     gabapentin 800 MG tablet  Commonly known as:  NEURONTIN     glipiZIDE 5 MG Tr24  Commonly known as:  GLUCOTROL     levothyroxine 100 MCG tablet  Commonly known as:  SYNTHROID     metFORMIN 1000 MG tablet  Commonly known as:  GLUCOPHAGE     metoprolol succinate 50 MG 24 hr  tablet  Commonly known as:  TOPROL-XL     nitroGLYCERIN 0.4 MG SL tablet  Commonly known as:  NITROSTAT     oxybutynin 5 MG Tab  Commonly known as:  DITROPAN     traMADol 50 mg tablet  Commonly known as:  ULTRAM     warfarin 10 MG tablet  Commonly known as:  COUMADIN           Where to Get Your Medications      You can get these medications from any pharmacy    Bring a paper prescription for each of these medications  · nitrofurantoin (macrocrystal-monohydrate) 100 MG capsule        Current Discharge Medication List            ED Diagnosis  1. Renal dysfunction    2. Confusion    3. Urinary tract infection without hematuria, site unspecified    4. Anemia, unspecified type                             Clinical Impression:   The primary encounter diagnosis was Renal dysfunction. Diagnoses of Confusion, Urinary tract infection without hematuria, site unspecified, and Anemia, unspecified type were also pertinent to this visit.      Disposition:   Disposition: Discharged  Condition: Stable                        Sukhdev Estrada Jr., MD  02/17/19 0253

## 2019-02-18 ENCOUNTER — TELEPHONE (OUTPATIENT)
Dept: EMERGENCY MEDICINE | Facility: HOSPITAL | Age: 84
End: 2019-02-18

## 2019-02-18 NOTE — TELEPHONE ENCOUNTER
Caregiver Deanna reports pt without new complaints. Denies increase SOB or work of breathing. Home health visiting this week.

## 2019-02-18 NOTE — TELEPHONE ENCOUNTER
----- Message from Sukhdev Estrada Jr., MD sent at 2/17/2019  9:51 PM CST -----  Please call the patient regarding her abnormal result. If not improving, please return to ER.

## 2019-02-19 LAB — BACTERIA UR CULT: NORMAL

## 2019-03-05 ENCOUNTER — HOSPITAL ENCOUNTER (EMERGENCY)
Facility: HOSPITAL | Age: 84
Discharge: HOME | End: 2019-03-05
Attending: EMERGENCY MEDICINE
Payer: MEDICARE

## 2019-03-05 VITALS
TEMPERATURE: 99 F | HEIGHT: 67 IN | DIASTOLIC BLOOD PRESSURE: 64 MMHG | RESPIRATION RATE: 18 BRPM | SYSTOLIC BLOOD PRESSURE: 128 MMHG | BODY MASS INDEX: 45.99 KG/M2 | HEART RATE: 67 BPM | WEIGHT: 293 LBS | OXYGEN SATURATION: 96 %

## 2019-03-05 DIAGNOSIS — N30.00 ACUTE CYSTITIS WITHOUT HEMATURIA: Primary | ICD-10-CM

## 2019-03-05 DIAGNOSIS — R53.1 GENERALIZED WEAKNESS: ICD-10-CM

## 2019-03-05 LAB
ALBUMIN SERPL BCP-MCNC: 2.5 G/DL
ALP SERPL-CCNC: 70 U/L
ALT SERPL W/O P-5'-P-CCNC: 13 U/L
ANION GAP SERPL CALC-SCNC: 10 MMOL/L
AST SERPL-CCNC: 18 U/L
BACTERIA #/AREA URNS AUTO: ABNORMAL /HPF
BASOPHILS # BLD AUTO: 0.02 K/UL
BASOPHILS NFR BLD: 0.3 %
BILIRUB SERPL-MCNC: 0.8 MG/DL
BILIRUB UR QL STRIP: NEGATIVE
BNP SERPL-MCNC: 952 PG/ML
BUN SERPL-MCNC: 29 MG/DL
CALCIUM SERPL-MCNC: 8.9 MG/DL
CHLORIDE SERPL-SCNC: 103 MMOL/L
CLARITY UR REFRACT.AUTO: ABNORMAL
CO2 SERPL-SCNC: 26 MMOL/L
COLOR UR AUTO: YELLOW
CREAT SERPL-MCNC: 1.9 MG/DL
DIFFERENTIAL METHOD: ABNORMAL
EOSINOPHIL # BLD AUTO: 0.1 K/UL
EOSINOPHIL NFR BLD: 1.6 %
ERYTHROCYTE [DISTWIDTH] IN BLOOD BY AUTOMATED COUNT: 20.9 %
EST. GFR  (AFRICAN AMERICAN): 26.2 ML/MIN/1.73 M^2
EST. GFR  (NON AFRICAN AMERICAN): 22.7 ML/MIN/1.73 M^2
GLUCOSE SERPL-MCNC: 154 MG/DL
GLUCOSE UR QL STRIP: NEGATIVE
HCT VFR BLD AUTO: 37.8 %
HGB BLD-MCNC: 11.9 G/DL
HGB UR QL STRIP: NEGATIVE
INR PPP: 2
KETONES UR QL STRIP: NEGATIVE
LEUKOCYTE ESTERASE UR QL STRIP: ABNORMAL
LYMPHOCYTES # BLD AUTO: 0.6 K/UL
LYMPHOCYTES NFR BLD: 8.3 %
MCH RBC QN AUTO: 27.4 PG
MCHC RBC AUTO-ENTMCNC: 31.5 G/DL
MCV RBC AUTO: 87 FL
MICROSCOPIC COMMENT: ABNORMAL
MONOCYTES # BLD AUTO: 0.6 K/UL
MONOCYTES NFR BLD: 9.2 %
NEUTROPHILS # BLD AUTO: 5.6 K/UL
NEUTROPHILS NFR BLD: 80.5 %
NITRITE UR QL STRIP: NEGATIVE
PH UR STRIP: 8 [PH] (ref 5–8)
PLATELET # BLD AUTO: 216 K/UL
PMV BLD AUTO: 12.5 FL
POTASSIUM SERPL-SCNC: 4.6 MMOL/L
PROT SERPL-MCNC: 5.8 G/DL
PROT UR QL STRIP: ABNORMAL
PROTHROMBIN TIME: 20.9 SEC
RBC # BLD AUTO: 4.35 M/UL
SODIUM SERPL-SCNC: 139 MMOL/L
SP GR UR STRIP: 1.01 (ref 1–1.03)
TROPONIN I SERPL DL<=0.01 NG/ML-MCNC: 0.03 NG/ML
URN SPEC COLLECT METH UR: ABNORMAL
UROBILINOGEN UR STRIP-ACNC: ABNORMAL EU/DL
WBC # BLD AUTO: 6.95 K/UL
WBC #/AREA URNS AUTO: 12 /HPF (ref 0–5)
WBC CLUMPS UR QL AUTO: ABNORMAL

## 2019-03-05 PROCEDURE — 93010 EKG 12-LEAD: ICD-10-PCS | Mod: ,,, | Performed by: INTERNAL MEDICINE

## 2019-03-05 PROCEDURE — 63600175 PHARM REV CODE 636 W HCPCS: Mod: ER | Performed by: EMERGENCY MEDICINE

## 2019-03-05 PROCEDURE — 85025 COMPLETE CBC W/AUTO DIFF WBC: CPT | Mod: ER

## 2019-03-05 PROCEDURE — 93010 ELECTROCARDIOGRAM REPORT: CPT | Mod: ,,, | Performed by: INTERNAL MEDICINE

## 2019-03-05 PROCEDURE — 84484 ASSAY OF TROPONIN QUANT: CPT | Mod: ER

## 2019-03-05 PROCEDURE — 87186 SC STD MICRODIL/AGAR DIL: CPT

## 2019-03-05 PROCEDURE — 99285 EMERGENCY DEPT VISIT HI MDM: CPT | Mod: 25,ER

## 2019-03-05 PROCEDURE — 99900035 HC TECH TIME PER 15 MIN (STAT): Mod: ER

## 2019-03-05 PROCEDURE — 87086 URINE CULTURE/COLONY COUNT: CPT

## 2019-03-05 PROCEDURE — 96365 THER/PROPH/DIAG IV INF INIT: CPT | Mod: ER

## 2019-03-05 PROCEDURE — 85610 PROTHROMBIN TIME: CPT | Mod: ER

## 2019-03-05 PROCEDURE — 93005 ELECTROCARDIOGRAM TRACING: CPT | Mod: ER

## 2019-03-05 PROCEDURE — 81000 URINALYSIS NONAUTO W/SCOPE: CPT | Mod: ER

## 2019-03-05 PROCEDURE — 80053 COMPREHEN METABOLIC PANEL: CPT | Mod: ER

## 2019-03-05 PROCEDURE — 83880 ASSAY OF NATRIURETIC PEPTIDE: CPT | Mod: ER

## 2019-03-05 PROCEDURE — 87077 CULTURE AEROBIC IDENTIFY: CPT

## 2019-03-05 PROCEDURE — 87088 URINE BACTERIA CULTURE: CPT

## 2019-03-05 RX ORDER — NITROFURANTOIN 25; 75 MG/1; MG/1
100 CAPSULE ORAL 2 TIMES DAILY
Qty: 10 CAPSULE | Refills: 0 | Status: SHIPPED | OUTPATIENT
Start: 2019-03-05 | End: 2019-03-10

## 2019-03-05 RX ORDER — LEVOFLOXACIN 750 MG/1
750 TABLET ORAL
Status: DISCONTINUED | OUTPATIENT
Start: 2019-03-05 | End: 2019-03-05

## 2019-03-05 RX ORDER — ERGOCALCIFEROL 1.25 MG/1
50000 CAPSULE ORAL
COMMUNITY

## 2019-03-05 RX ORDER — FOLIC ACID 0.8 MG
800 TABLET ORAL DAILY
COMMUNITY

## 2019-03-05 RX ADMIN — CEFTRIAXONE 1 G: 1 INJECTION, SOLUTION INTRAVENOUS at 03:03

## 2019-03-05 NOTE — ED NOTES
Pt stated that she was on the highway [place] her way to the hospital. She stated that she felt good. She does know her name, but is very confused.

## 2019-03-05 NOTE — ED NOTES
In-and-out catheter performed for sterile urine sample. Urine return was dark yellow with odor. Pt handled well. Hand hygiene and sterile method was performed.

## 2019-03-05 NOTE — ED PROVIDER NOTES
Encounter Date: 3/5/2019       History     Chief Complaint   Patient presents with    Weakness     generalized, since this am      Patient currently presents via EMS with concern regarding generalized weakness.  Onset reportedly began this morning.  Patient's contribution history limited secondary to chronic dementia though family indicates her level of confusion appears to exceed her baseline.  She denies any specific chest pain or shortness of breath.  There has been no fever or chills.  No urinary complaints denied as is abdominal pain.  Patient was diagnosed with a UTI in mid-Feb following a similar presentation.          Review of patient's allergies indicates:  No Known Allergies  Past Medical History:   Diagnosis Date    CHF (congestive heart failure)     Diabetes mellitus     Hypertension     Thyroid disease      Past Surgical History:   Procedure Laterality Date    CARDIAC VALVE REPLACEMENT      HERNIA REPAIR       History reviewed. No pertinent family history.  Social History     Tobacco Use    Smoking status: Never Smoker    Smokeless tobacco: Never Used   Substance Use Topics    Alcohol use: No    Drug use: No     Review of Systems   Constitutional: Negative for chills and fever.   HENT: Negative for congestion and rhinorrhea.    Respiratory: Negative for cough, chest tightness, shortness of breath and wheezing.    Cardiovascular: Negative for chest pain, palpitations and leg swelling.   Gastrointestinal: Negative for abdominal pain, constipation, diarrhea, nausea and vomiting.   Genitourinary: Negative for dysuria, frequency, urgency, vaginal bleeding and vaginal discharge.   Skin: Negative for color change and rash.   Allergic/Immunologic: Negative for immunocompromised state.   Neurological: Positive for weakness. Negative for dizziness, numbness and headaches.   Hematological: Negative for adenopathy. Does not bruise/bleed easily.   All other systems reviewed and are  "negative.      Physical Exam     Initial Vitals [03/05/19 1214]   BP Pulse Resp Temp SpO2   125/62 70 20 97.5 °F (36.4 °C) 97 %      MAP       --         Vitals:    03/05/19 1214 03/05/19 1245 03/05/19 1340 03/05/19 1506   BP: 125/62  112/65 107/75   Pulse: 70 67 77 78   Resp: 20  20 18   Temp: 97.5 °F (36.4 °C)  97.3 °F (36.3 °C) 98.6 °F (37 °C)   TempSrc: Oral  Oral Oral   SpO2: 97%  95% 96%   Weight: (!) 205.9 kg (453 lb 14.8 oz)      Height: 5' 7" (1.702 m)       03/05/19 1605   BP: 128/64   Pulse: 67   Resp: 18   Temp: 98.8 °F (37.1 °C)   TempSrc: Oral   SpO2: 96%   Weight:    Height:      Physical Exam    Nursing note and vitals reviewed.  Constitutional: She appears well-developed and well-nourished. She is not diaphoretic. No distress.   HENT:   Head: Normocephalic and atraumatic.   Right Ear: External ear normal.   Left Ear: External ear normal.   Nose: Nose normal.   Mouth/Throat: Oropharynx is clear and moist.   Eyes: Conjunctivae and EOM are normal. Pupils are equal, round, and reactive to light. No scleral icterus.   Neck: Neck supple. No tracheal deviation present. No JVD present.   Cardiovascular: Normal rate, regular rhythm, normal heart sounds and intact distal pulses. Exam reveals no gallop and no friction rub.    No murmur heard.  Pulmonary/Chest: Breath sounds normal. No respiratory distress. She has no wheezes. She has no rhonchi. She has no rales.   Abdominal: Soft. Bowel sounds are normal. She exhibits no distension. There is no tenderness.   Musculoskeletal: Normal range of motion. She exhibits no edema.   Neurological: She is alert. She has normal strength. No cranial nerve deficit or sensory deficit. GCS score is 15. GCS eye subscore is 4. GCS verbal subscore is 5. GCS motor subscore is 6.   Skin: Skin is warm and dry. No rash noted.   Psychiatric: She has a normal mood and affect. Her behavior is normal.         ED Course   Procedures  Labs Reviewed   B-TYPE NATRIURETIC PEPTIDE - Abnormal; " Notable for the following components:       Result Value     (*)     All other components within normal limits   CBC W/ AUTO DIFFERENTIAL - Abnormal; Notable for the following components:    Hemoglobin 11.9 (*)     MCHC 31.5 (*)     RDW 20.9 (*)     Lymph # 0.6 (*)     Gran% 80.5 (*)     Lymph% 8.3 (*)     All other components within normal limits   COMPREHENSIVE METABOLIC PANEL - Abnormal; Notable for the following components:    Glucose 154 (*)     Creatinine 1.9 (*)     Total Protein 5.8 (*)     Albumin 2.5 (*)     eGFR if  26.2 (*)     eGFR if non  22.7 (*)     All other components within normal limits   URINALYSIS, REFLEX TO URINE CULTURE - Abnormal; Notable for the following components:    Appearance, UA Cloudy (*)     Protein, UA Trace (*)     Urobilinogen, UA 4.0-6.0 (*)     Leukocytes, UA 3+ (*)     All other components within normal limits    Narrative:     Preferred Collection Type->Urine, Clean Catch   PROTIME-INR - Abnormal; Notable for the following components:    Prothrombin Time 20.9 (*)     INR 2.0 (*)     All other components within normal limits   URINALYSIS MICROSCOPIC - Abnormal; Notable for the following components:    WBC, UA 12 (*)     Bacteria, UA Many (*)     All other components within normal limits    Narrative:     Preferred Collection Type->Urine, Clean Catch   CULTURE, URINE   TROPONIN I     EKG Readings: (Independently Interpreted)   Initial Reading: No STEMI. Rhythm: Atrial Fibrillation. Heart Rate: 64. Ectopy: No Ectopy. Conduction: Normal. Axis: Normal. Q Waves: V1 and V2.     ECG Results          EKG 12-lead (In process)  Result time 03/05/19 12:47:43    In process by Interface, Lab In [facility] (03/05/19 12:47:43)                 Narrative:    Test Reason :     Vent. Rate : 064 BPM     Atrial Rate : 375 BPM     P-R Int : 000 ms          QRS Dur : 114 ms      QT Int : 438 ms       P-R-T Axes : 000 -31 159 degrees     QTc Int : 451 ms    Atrial  fibrillation  Left axis deviation  Incomplete right bundle branch block  Septal infarct ,age undetermined  T wave abnormality, consider lateral ischemia  Abnormal ECG  When compared with ECG of 16-FEB-2019 23:33,  Incomplete right bundle branch block has replaced Incomplete left bundle  branch block  Septal infarct is now Present    Referred By: CARLO   SELF           Confirmed By:                             Imaging Results          X-Ray Chest AP Portable (Final result)  Result time 03/05/19 12:40:13    Final result by TONIO Heaton Sr., MD (03/05/19 12:40:13)                 Impression:      1. There is a mild amount of haziness in the base of the right lung.  This is characteristic of atelectasis or pneumonia.  2. There is mild cardiomegaly.  3. There are multiple sternotomy wires in place.  .      Electronically signed by: Westley Heaton MD  Date:    03/05/2019  Time:    12:40             Narrative:    EXAMINATION:  XR CHEST AP PORTABLE    CLINICAL HISTORY:  Generalized Weakness;    COMPARISON:  02/16/2019    FINDINGS:  There are multiple sternotomy wires in place.  There is mild cardiomegaly.  There is a mild amount of haziness in the base of the right lung.  There is no focal pulmonary infiltrate visualized in the left lung.  There is no pneumothorax.  The costophrenic angles are sharp.                                 Medical Decision Making:   ED Management:  All findings were reviewed with the patient/family in detail along with the diagnosis of acute cystitis with generalized weakness.  I see no indication of an emergent process beyond that addressed during our encounter but have duly counseled the patient/family regarding the need for prompt follow-up as well as the indications that should prompt immediate return to the emergency room should new or worrisome developments occur.  The patient/family communicates understanding of all this information and all remaining questions and concerns were  addressed at this time.                          Clinical Impression:       ICD-10-CM ICD-9-CM   1. Acute cystitis without hematuria N30.00 595.0   2. Generalized weakness R53.1 780.79                                Jaquan Min MD  03/05/19 [id number]

## 2019-03-08 LAB — BACTERIA UR CULT: NORMAL

## 2019-06-14 ENCOUNTER — HOSPITAL ENCOUNTER (EMERGENCY)
Facility: HOSPITAL | Age: 84
Discharge: ANOTHER HOSPITAL | End: 2019-06-14
Attending: EMERGENCY MEDICINE
Payer: MEDICARE

## 2019-06-14 VITALS
WEIGHT: 205 LBS | OXYGEN SATURATION: 100 % | BODY MASS INDEX: 32.11 KG/M2 | HEART RATE: 65 BPM | TEMPERATURE: 91 F | DIASTOLIC BLOOD PRESSURE: 65 MMHG | RESPIRATION RATE: 20 BRPM | SYSTOLIC BLOOD PRESSURE: 120 MMHG

## 2019-06-14 DIAGNOSIS — R57.0 CARDIOGENIC SHOCK: Primary | ICD-10-CM

## 2019-06-14 DIAGNOSIS — D61.818 PANCYTOPENIA: ICD-10-CM

## 2019-06-14 DIAGNOSIS — R00.1 SYMPTOMATIC BRADYCARDIA: ICD-10-CM

## 2019-06-14 DIAGNOSIS — N17.9 AKI (ACUTE KIDNEY INJURY): ICD-10-CM

## 2019-06-14 DIAGNOSIS — R53.1 WEAKNESS: ICD-10-CM

## 2019-06-14 DIAGNOSIS — T68.XXXA HYPOTHERMIA, INITIAL ENCOUNTER: ICD-10-CM

## 2019-06-14 LAB
ALBUMIN SERPL BCP-MCNC: 3 G/DL (ref 3.5–5.2)
ALLENS TEST: ABNORMAL
ALP SERPL-CCNC: 90 U/L (ref 55–135)
ALT SERPL W/O P-5'-P-CCNC: 27 U/L (ref 10–44)
AMPHET+METHAMPHET UR QL: NEGATIVE
ANION GAP SERPL CALC-SCNC: 13 MMOL/L (ref 8–16)
ANISOCYTOSIS BLD QL SMEAR: ABNORMAL
AST SERPL-CCNC: 40 U/L (ref 10–40)
BACTERIA #/AREA URNS AUTO: ABNORMAL /HPF
BARBITURATES UR QL SCN>200 NG/ML: NEGATIVE
BASOPHILS NFR BLD: 0 % (ref 0–1.9)
BENZODIAZ UR QL SCN>200 NG/ML: NEGATIVE
BILIRUB SERPL-MCNC: 0.8 MG/DL (ref 0.1–1)
BILIRUB UR QL STRIP: NEGATIVE
BUN SERPL-MCNC: 64 MG/DL (ref 10–30)
BZE UR QL SCN: NEGATIVE
CALCIUM SERPL-MCNC: 9.7 MG/DL (ref 8.7–10.5)
CANNABINOIDS UR QL SCN: NEGATIVE
CHLORIDE SERPL-SCNC: 102 MMOL/L (ref 95–110)
CLARITY UR REFRACT.AUTO: CLEAR
CO2 SERPL-SCNC: 23 MMOL/L (ref 23–29)
COLOR UR AUTO: YELLOW
CREAT SERPL-MCNC: 2.9 MG/DL (ref 0.5–1.4)
CREAT UR-MCNC: 115.5 MG/DL (ref 15–325)
DACRYOCYTES BLD QL SMEAR: ABNORMAL
DELSYS: ABNORMAL
DIFFERENTIAL METHOD: ABNORMAL
EOSINOPHIL NFR BLD: 0 % (ref 0–8)
ERYTHROCYTE [DISTWIDTH] IN BLOOD BY AUTOMATED COUNT: 24.1 % (ref 11.5–14.5)
EST. GFR  (AFRICAN AMERICAN): 15.7 ML/MIN/1.73 M^2
EST. GFR  (NON AFRICAN AMERICAN): 13.6 ML/MIN/1.73 M^2
FIO2: 28
FLOW: 2
GLUCOSE SERPL-MCNC: 145 MG/DL (ref 70–110)
GLUCOSE UR QL STRIP: NEGATIVE
HCO3 UR-SCNC: 23.7 MMOL/L (ref 24–28)
HCT VFR BLD AUTO: 25.7 % (ref 37–48.5)
HGB BLD-MCNC: 7.8 G/DL (ref 12–16)
HGB UR QL STRIP: ABNORMAL
HYALINE CASTS UR QL AUTO: 0 /LPF
HYPOCHROMIA BLD QL SMEAR: ABNORMAL
KETONES UR QL STRIP: ABNORMAL
LACTATE SERPL-SCNC: 1.3 MMOL/L (ref 0.5–2.2)
LACTATE SERPL-SCNC: 2 MMOL/L (ref 0.5–2.2)
LEUKOCYTE ESTERASE UR QL STRIP: NEGATIVE
LYMPHOCYTES NFR BLD: 34 % (ref 18–48)
MCH RBC QN AUTO: 27.6 PG (ref 27–31)
MCHC RBC AUTO-ENTMCNC: 30.4 G/DL (ref 32–36)
MCV RBC AUTO: 91 FL (ref 82–98)
METHADONE UR QL SCN>300 NG/ML: NEGATIVE
MICROSCOPIC COMMENT: ABNORMAL
MODE: ABNORMAL
MONOCYTES NFR BLD: 0 % (ref 4–15)
NEUTROPHILS NFR BLD: 66 % (ref 38–73)
NITRITE UR QL STRIP: NEGATIVE
OPIATES UR QL SCN: NEGATIVE
OVALOCYTES BLD QL SMEAR: ABNORMAL
PCO2 BLDA: 56.9 MMHG (ref 35–45)
PCP UR QL SCN>25 NG/ML: NEGATIVE
PH SMN: 7.23 [PH] (ref 7.35–7.45)
PH UR STRIP: 5 [PH] (ref 5–8)
PLATELET # BLD AUTO: 71 K/UL (ref 150–350)
PLATELET BLD QL SMEAR: ABNORMAL
PMV BLD AUTO: ABNORMAL FL (ref 9.2–12.9)
PO2 BLDA: 157 MMHG (ref 80–100)
POC BE: -4 MMOL/L
POC SATURATED O2: 99 % (ref 95–100)
POIKILOCYTOSIS BLD QL SMEAR: SLIGHT
POLYCHROMASIA BLD QL SMEAR: ABNORMAL
POTASSIUM SERPL-SCNC: 5.6 MMOL/L (ref 3.5–5.1)
PROT SERPL-MCNC: 7.3 G/DL (ref 6–8.4)
PROT UR QL STRIP: ABNORMAL
RBC # BLD AUTO: 2.83 M/UL (ref 4–5.4)
RBC #/AREA URNS AUTO: 0 /HPF (ref 0–4)
SAMPLE: ABNORMAL
SCHISTOCYTES BLD QL SMEAR: PRESENT
SITE: ABNORMAL
SODIUM SERPL-SCNC: 138 MMOL/L (ref 136–145)
SP GR UR STRIP: 1.02 (ref 1–1.03)
SPHEROCYTES BLD QL SMEAR: ABNORMAL
SQUAMOUS #/AREA URNS AUTO: 0 /HPF
T4 FREE SERPL-MCNC: 0.79 NG/DL (ref 0.71–1.51)
TARGETS BLD QL SMEAR: ABNORMAL
TOXICOLOGY INFORMATION: NORMAL
TSH SERPL DL<=0.005 MIU/L-ACNC: 4.3 UIU/ML (ref 0.4–4)
URN SPEC COLLECT METH UR: ABNORMAL
UROBILINOGEN UR STRIP-ACNC: <2 EU/DL
WBC # BLD AUTO: 1.99 K/UL (ref 3.9–12.7)
WBC #/AREA URNS AUTO: 0 /HPF (ref 0–5)

## 2019-06-14 PROCEDURE — 93010 EKG 12-LEAD: ICD-10-PCS | Mod: ,,, | Performed by: INTERNAL MEDICINE

## 2019-06-14 PROCEDURE — 99291 CRITICAL CARE FIRST HOUR: CPT | Mod: 25,ER

## 2019-06-14 PROCEDURE — 80053 COMPREHEN METABOLIC PANEL: CPT | Mod: ER

## 2019-06-14 PROCEDURE — 25000003 PHARM REV CODE 250: Mod: ER | Performed by: EMERGENCY MEDICINE

## 2019-06-14 PROCEDURE — 96374 THER/PROPH/DIAG INJ IV PUSH: CPT | Mod: 59,ER

## 2019-06-14 PROCEDURE — 99900035 HC TECH TIME PER 15 MIN (STAT): Mod: ER

## 2019-06-14 PROCEDURE — 85027 COMPLETE CBC AUTOMATED: CPT | Mod: ER

## 2019-06-14 PROCEDURE — S5010 5% DEXTROSE AND 0.45% SALINE: HCPCS | Mod: ER | Performed by: EMERGENCY MEDICINE

## 2019-06-14 PROCEDURE — 83605 ASSAY OF LACTIC ACID: CPT | Mod: ER

## 2019-06-14 PROCEDURE — 96367 TX/PROPH/DG ADDL SEQ IV INF: CPT | Mod: ER

## 2019-06-14 PROCEDURE — 96361 HYDRATE IV INFUSION ADD-ON: CPT | Mod: 59,ER

## 2019-06-14 PROCEDURE — 80307 DRUG TEST PRSMV CHEM ANLYZR: CPT | Mod: ER

## 2019-06-14 PROCEDURE — 93041 RHYTHM ECG TRACING: CPT | Mod: ER

## 2019-06-14 PROCEDURE — 63600175 PHARM REV CODE 636 W HCPCS: Mod: ER | Performed by: EMERGENCY MEDICINE

## 2019-06-14 PROCEDURE — 96365 THER/PROPH/DIAG IV INF INIT: CPT | Mod: ER

## 2019-06-14 PROCEDURE — 83605 ASSAY OF LACTIC ACID: CPT | Mod: 91,ER

## 2019-06-14 PROCEDURE — 84439 ASSAY OF FREE THYROXINE: CPT | Mod: ER

## 2019-06-14 PROCEDURE — 87040 BLOOD CULTURE FOR BACTERIA: CPT

## 2019-06-14 PROCEDURE — 93010 ELECTROCARDIOGRAM REPORT: CPT | Mod: ,,, | Performed by: INTERNAL MEDICINE

## 2019-06-14 PROCEDURE — 96375 TX/PRO/DX INJ NEW DRUG ADDON: CPT | Mod: ER

## 2019-06-14 PROCEDURE — 96366 THER/PROPH/DIAG IV INF ADDON: CPT | Mod: ER,59

## 2019-06-14 PROCEDURE — 81000 URINALYSIS NONAUTO W/SCOPE: CPT | Mod: ER

## 2019-06-14 PROCEDURE — 85007 BL SMEAR W/DIFF WBC COUNT: CPT | Mod: ER

## 2019-06-14 PROCEDURE — 93005 ELECTROCARDIOGRAM TRACING: CPT | Mod: ER,59

## 2019-06-14 PROCEDURE — 36556 INSERT NON-TUNNEL CV CATH: CPT | Mod: ER

## 2019-06-14 PROCEDURE — 84484 ASSAY OF TROPONIN QUANT: CPT | Mod: ER

## 2019-06-14 PROCEDURE — 99292 CRITICAL CARE ADDL 30 MIN: CPT | Mod: ER

## 2019-06-14 PROCEDURE — 82803 BLOOD GASES ANY COMBINATION: CPT | Mod: ER

## 2019-06-14 PROCEDURE — 96376 TX/PRO/DX INJ SAME DRUG ADON: CPT | Mod: 59,ER

## 2019-06-14 PROCEDURE — 84443 ASSAY THYROID STIM HORMONE: CPT | Mod: ER

## 2019-06-14 PROCEDURE — 36600 WITHDRAWAL OF ARTERIAL BLOOD: CPT | Mod: ER

## 2019-06-14 RX ORDER — NALOXONE HYDROCHLORIDE 0.4 MG/ML
2 INJECTION, SOLUTION INTRAMUSCULAR; INTRAVENOUS; SUBCUTANEOUS
Status: COMPLETED | OUTPATIENT
Start: 2019-06-14 | End: 2019-06-14

## 2019-06-14 RX ORDER — NOREPINEPHRINE BITARTRATE/D5W 4MG/250ML
0.05 PLASTIC BAG, INJECTION (ML) INTRAVENOUS CONTINUOUS
Status: DISCONTINUED | OUTPATIENT
Start: 2019-06-14 | End: 2019-06-15 | Stop reason: HOSPADM

## 2019-06-14 RX ORDER — VANCOMYCIN HCL IN 5 % DEXTROSE 1G/250ML
1000 PLASTIC BAG, INJECTION (ML) INTRAVENOUS
Status: COMPLETED | OUTPATIENT
Start: 2019-06-14 | End: 2019-06-14

## 2019-06-14 RX ORDER — GLUCAGON 1 MG
3 KIT INJECTION
Status: COMPLETED | OUTPATIENT
Start: 2019-06-14 | End: 2019-06-14

## 2019-06-14 RX ORDER — DEXTROSE MONOHYDRATE AND SODIUM CHLORIDE 5; .45 G/100ML; G/100ML
1000 INJECTION, SOLUTION INTRAVENOUS CONTINUOUS
Status: DISCONTINUED | OUTPATIENT
Start: 2019-06-14 | End: 2019-06-15 | Stop reason: HOSPADM

## 2019-06-14 RX ADMIN — DEXTROSE AND SODIUM CHLORIDE 1000 ML: 5; .45 INJECTION, SOLUTION INTRAVENOUS at 10:06

## 2019-06-14 RX ADMIN — PIPERACILLIN AND TAZOBACTAM 4.5 G: 4; .5 INJECTION, POWDER, LYOPHILIZED, FOR SOLUTION INTRAVENOUS; PARENTERAL at 08:06

## 2019-06-14 RX ADMIN — GLUCAGON 3 MG: KIT at 08:06

## 2019-06-14 RX ADMIN — NALOXONE HYDROCHLORIDE 2 MG: 0.4 INJECTION, SOLUTION INTRAMUSCULAR; INTRAVENOUS; SUBCUTANEOUS at 05:06

## 2019-06-14 RX ADMIN — SODIUM CHLORIDE 2790 ML: 0.9 INJECTION, SOLUTION INTRAVENOUS at 05:06

## 2019-06-14 RX ADMIN — VANCOMYCIN HYDROCHLORIDE 1000 MG: 1 INJECTION, POWDER, FOR SOLUTION INTRAVENOUS at 08:06

## 2019-06-14 RX ADMIN — Medication 0.05 MCG/KG/MIN: at 07:06

## 2019-06-14 RX ADMIN — VANCOMYCIN HYDROCHLORIDE 500 MG: 500 INJECTION, POWDER, LYOPHILIZED, FOR SOLUTION INTRAVENOUS at 09:06

## 2019-06-14 RX ADMIN — GLUCAGON HYDROCHLORIDE 3 MG: KIT at 06:06

## 2019-06-14 NOTE — ED NOTES
Multiple attempts to insert peripheral IV by nursing staff. Md informed. Will place central line.

## 2019-06-14 NOTE — ED PROVIDER NOTES
Encounter Date: 6/14/2019       History     Chief Complaint   Patient presents with    Altered Mental Status     altered mental status, normal mental status unknown, bradycardia pta     Patient was lethargic and bradycardic on scene, they gave 0.5 of atropine, and the heart rate came up to 60 and she became more alert.     The history is provided by the EMS personnel.   Altered Mental Status   This is a new problem. The current episode started 3 to 5 hours ago. The problem occurs constantly. The problem has not changed since onset.Pertinent negatives include no chest pain, no abdominal pain, no headaches and no shortness of breath.     Review of patient's allergies indicates:  No Known Allergies  Past Medical History:   Diagnosis Date    Asthma     BPH (benign prostatic hyperplasia)     CHF (congestive heart failure)     CKD (chronic kidney disease)     Coronary artery disease     Depression     Diabetes mellitus     GERD (gastroesophageal reflux disease)     Glaucoma     HLD (hyperlipidemia)     Hypertension     Thyroid disease     Vitamin deficiency      Past Surgical History:   Procedure Laterality Date    CARDIAC VALVE REPLACEMENT      HERNIA REPAIR       History reviewed. No pertinent family history.  Social History     Tobacco Use    Smoking status: Never Smoker    Smokeless tobacco: Never Used   Substance Use Topics    Alcohol use: No    Drug use: No     Review of Systems   Constitutional: Negative for fever.   HENT: Negative for sore throat.    Respiratory: Negative for shortness of breath.    Cardiovascular: Negative for chest pain.   Gastrointestinal: Negative for abdominal pain and nausea.   Genitourinary: Negative for dysuria.   Musculoskeletal: Negative for back pain.   Skin: Negative for rash.   Neurological: Negative for weakness and headaches.   Hematological: Does not bruise/bleed easily.       Physical Exam     Initial Vitals   BP Pulse Resp Temp SpO2   06/14/19 1701 06/14/19  1701 06/14/19 1701 06/14/19 1808 06/14/19 1701   111/67 69 (!) 28 (!) 90.3 °F (32.4 °C) 96 %      MAP       --                Vitals:    06/14/19 1911 06/14/19 1916 06/14/19 1926 06/14/19 1931   BP: (!) 88/52 (!) 83/50 90/60 (!) 83/58   Pulse: (!) 55 (!) 50 60 (!) 51   Resp: (!) 22 (!) 21 (!) 26    Temp:       TempSrc:       SpO2: 100% 95% 98%    Weight:        06/14/19 1933 06/14/19 1937 06/14/19 1946 06/14/19 1951   BP:  (!) 88/59 (!) 84/58 110/73   Pulse:  (!) 50 (!) 53    Resp:   16    Temp: (!) 89.7 °F (32.1 °C)      TempSrc: Rectal      SpO2:       Weight:        06/14/19 1952 06/14/19 1957 06/14/19 2006 06/14/19 2039   BP: 110/73 128/87 127/89    Pulse: (!) 55 71 67    Resp:  (!) 24 (!) 24    Temp:    (!) 90.5 °F (32.5 °C)   TempSrc:    Rectal   SpO2:       Weight:        06/14/19 2121 06/14/19 2144 06/14/19 2146   BP: 125/82  128/79   Pulse: 72  75   Resp: 16     Temp:  (!) 90.8 °F (32.7 °C)    TempSrc:  Rectal    SpO2:   100%   Weight:        Physical Exam    Nursing note and vitals reviewed.  Constitutional: She appears well-developed and well-nourished. No distress.   Elderly, Frail. Snoring respirations.     HENT:   Head: Normocephalic and atraumatic.   Mouth/Throat: Oropharynx is clear and moist.   Eyes: Conjunctivae and EOM are normal. Pupils are equal, round, and reactive to light.   Neck: Normal range of motion. Neck supple.   Cardiovascular: Normal rate and normal heart sounds. An irregularly irregular rhythm present.  Exam reveals no gallop and no friction rub.    No murmur heard.  Pulmonary/Chest: Breath sounds normal. No respiratory distress. She has no wheezes. She has no rhonchi. She has no rales.   Abdominal: Soft. Bowel sounds are normal. She exhibits no distension and no mass. There is no tenderness. There is no rebound and no guarding.   Musculoskeletal: Normal range of motion. She exhibits no edema or tenderness.   Neurological:   Snoring respirations. Awakens slightly to sternal rub.    "  Skin: Skin is warm and dry. No rash noted.         ED Course   Critical Care  Date/Time: 6/14/2019 10:24 PM  Performed by: Jaquan Min MD  Authorized by: Jaquan Min MD   Direct patient critical care time: 40 minutes  Ordering / reviewing critical care time: 8 minutes  Documentation critical care time: 14 minutes  Consult with family critical care time: 20 minutes  Total critical care time (exclusive of procedural time) : 82 minutes  Critical care time was exclusive of separately billable procedures and treating other patients.  Critical care was necessary to treat or prevent imminent or life-threatening deterioration of the following conditions: sepsis.  Critical care was time spent personally by me on the following activities: re-evaluation of patient's condition, pulse oximetry, ordering and review of laboratory studies, ordering and performing treatments and interventions, ordering and review of radiographic studies, examination of patient, development of treatment plan with patient or surrogate, blood draw for specimens and evaluation of patient's response to treatment.  Subsequent provider of critical care: I assumed direction of critical care for this patient from another provider of my specialty.    Central Line  Date/Time: 6/14/2019 10:24 PM  Performed by: Jaquan Min MD  Consent Done: Emergent Situation  Time out: Immediately prior to procedure a "time out" was called to verify the correct patient, procedure, equipment, support staff and site/side marked as required.  Indications: vascular access and med administration  Anesthesia: local infiltration    Anesthesia:  Local Anesthetic: lidocaine 1% without epinephrine  Preparation: skin prepped with ChloraPrep  Skin prep agent dried: skin prep agent completely dried prior to procedure  Sterile barriers: all five maximum sterile barriers used - cap, mask, sterile gown, sterile gloves, and large sterile sheet  Hand hygiene: hand hygiene " performed prior to central venous catheter insertion  Location details: right subclavian  Catheter type: triple lumen  Catheter size: 7 Fr  Catheter Length: 15cm    Number of attempts: 2  Assessment: placement verified by x-ray and no pneumothorax on x-ray  Complications: none  Estimated blood loss (mL): 3  Post-procedure: line sutured,  chlorhexidine patch and sterile dressing applied  Complications: No        Labs Reviewed   COMPREHENSIVE METABOLIC PANEL - Abnormal; Notable for the following components:       Result Value    Potassium 5.6 (*)     Glucose 145 (*)     BUN, Bld 64 (*)     Creatinine 2.9 (*)     Albumin 3.0 (*)     eGFR if  15.7 (*)     eGFR if non  13.6 (*)     All other components within normal limits   URINALYSIS, REFLEX TO URINE CULTURE - Abnormal; Notable for the following components:    Protein, UA 1+ (*)     Ketones, UA Trace (*)     Occult Blood UA Trace (*)     All other components within normal limits    Narrative:     Preferred Collection Type->Urine, Clean Catch   TSH - Abnormal; Notable for the following components:    TSH 4.302 (*)     All other components within normal limits   CBC W/ AUTO DIFFERENTIAL - Abnormal; Notable for the following components:    WBC 1.99 (*)     RBC 2.83 (*)     Hemoglobin 7.8 (*)     Hematocrit 25.7 (*)     Mean Corpuscular Hemoglobin Conc 30.4 (*)     RDW 24.1 (*)     Platelets 71 (*)     Mono% 0.0 (*)     Platelet Estimate Decreased (*)     All other components within normal limits    Narrative:        WBC critical result(s) called and verbal readback obtained from   Bonny Moore RN., 06/14/2019 19:49   URINALYSIS MICROSCOPIC - Abnormal; Notable for the following components:    Bacteria Many (*)     All other components within normal limits    Narrative:     Preferred Collection Type->Urine, Clean Catch   ISTAT PROCEDURE - Abnormal; Notable for the following components:    POC PH 7.228 (*)     POC PCO2 56.9 (*)     POC PO2  157 (*)     POC HCO3 23.7 (*)     All other components within normal limits   CULTURE, BLOOD   CULTURE, BLOOD   LACTIC ACID, PLASMA   DRUG SCREEN PANEL, URINE EMERGENCY    Narrative:     Preferred Collection Type->Urine, Clean Catch   T4, FREE   LACTIC ACID, PLASMA     EKG Readings: (Independently Interpreted)   Initial Reading: No STEMI. Rhythm: Atrial Flutter. Heart Rate: 64. Ectopy: No Ectopy. Axis: Left Axis Deviation. Other Impression: Variable block       Imaging Results          CT Abdomen Without Contrast (Final result)  Result time 06/14/19 21:27:10    Final result by Robert Dunham MD (06/14/19 21:27:10)                 Impression:      No bowel obstruction, intra-abdominal abscess, free fluid, or free air.    Large volume stool throughout the colon and rectum.  Prominent rectal stool measuring 7 cm transverse diameter suspicious for rectal fecal impaction.    Body wall anasarca.    Soft tissue attenuation exophytic 1.7 cm right upper pole renal structure, indeterminate. Recommend nonemergent further evaluation with renal ultrasound.      Electronically signed by: Robert Dunham  Date:    06/14/2019  Time:    21:27             Narrative:    EXAMINATION:  CT ABDOMEN WITHOUT CONTRAST    CLINICAL HISTORY:  Abdominal Pain, SIRS;    TECHNIQUE:  Low dose axial images, sagittal and coronal reformations were obtained from the lung bases to the pubic symphysis, Oral contrast was not administered.  All CT scans at this location are performed using dose modulation techniques as appropriate to a performed exam including the following: Automated exposure control; adjustment of the mA and/or kV  according to patient size.    COMPARISON:  None    FINDINGS:  Heart: Significant cardiomegaly.    Lung Bases: Peribronchial cuffing, and linear reticular interstitial opacification with pleural thickening, likely sequela of prior episodes of pulmonary edema.    Liver: Normal in size and attenuation, with no focal hepatic  lesions.    Gallbladder: Cholecystectomy clips.    Bile Ducts: No evidence of dilated ducts.    Pancreas: Moderate to severe fatty atrophy of the pancreas.    Spleen: Unremarkable.    Adrenals: Unremarkable.    Kidneys/ Ureters: Bilateral renal cysts, largest at the left lower pole measuring 4.8 cm, right upper pole 5.1 cm.  Soft tissue attenuation exophytic 1.7 cm right upper pole structure, indeterminate.  Recommend nonemergent further evaluation with renal ultrasound.    Bladder: No evidence of wall thickening.    Reproductive organs: Unremarkable.    GI Tract/Mesentery: Large volume stool throughout the colon and rectum.  Prominent rectal stool measuring 7 cm transverse diameter suspicious for rectal fecal impaction.  No evidence of appendicitis.    Peritoneal Space: No ascites. No free air.    Retroperitoneum: No significant adenopathy.    Abdominal wall: Body wall anasarca.    Vasculature: Advanced aortoiliac atherosclerotic calcification.    Bones: No acute fracture.  Lumbar facet arthropathy.  Mild grade 1 anterolisthesis L4 on L5.                               X-Ray Chest AP Portable (Final result)  Result time 06/14/19 19:40:33    Final result by Chavez Frost MD (06/14/19 19:40:33)                 Impression:      Good positioning of central venous catheter.  Negative for pneumothorax      Electronically signed by: Chavez Frost MD  Date:    06/14/2019  Time:    19:40             Narrative:    EXAMINATION:  XR CHEST AP PORTABLE    CLINICAL HISTORY:  Post Central Line Placement;    TECHNIQUE:  Single frontal view of the chest was performed.    COMPARISON:  06/14/2019    FINDINGS:  Postop changes mediastinum with previous cardiovascular surgery and valvular replacement.  Stable cardiomegaly.    Shallow inspiration with vascular congestion.  Stable thickening of the pleural stripe on the lateral right hemithorax likely indicative of pleural effusion.  Small left effusion as well.  Nodular opacity lateral to  the right hilum could be summation shadow with overlapping rib though needs follow-up to exclude developing lung nodule.    Right-sided central venous catheter placement with tip at the distal SVC.                               CT Head Without Contrast (Final result)  Result time 06/14/19 18:13:16    Final result by Chavez Frost MD (06/14/19 18:13:16)                 Impression:      Negative for acute intracranial abnormality.    All CT scans at this facility are performed  using dose modulation techniques as appropriate to performed exam including the following:  automated exposure control; adjustment of mA and/or kV according to the patients size (this includes techniques or standardized protocols for targeted exams where dose is matched to indication/reason for exam: i.e. extremities or head);  iterative reconstruction technique.      Electronically signed by: Chavez Frost MD  Date:    06/14/2019  Time:    18:13             Narrative:    EXAMINATION:  CT HEAD WITHOUT CONTRAST    CLINICAL HISTORY:  Confusion/delirium, altered LOC, unexplained;    TECHNIQUE:  Axial CT images obtained throughout the head without intravenous contrast.    COMPARISON:  February 16, 2019 head CT, 11/13/2018 head CT    FINDINGS:  Negative for acute hemorrhage, mass effect, extraaxial collection, hydrocephalus.    Stable chronic small vessel ischemic changes throughout the white matter.  Calcified plaque skull-base vasculature.    Stable moderate mucosal thickening left maxillary sinus.    The calvarium is unremarkable with no fractures.                               X-Ray Chest AP Portable (Final result)  Result time 06/14/19 18:01:37    Final result by Chavez Frost MD (06/14/19 18:01:37)                 Impression:      Stable cardiomegaly with small pleural effusions.  Negative for pulmonary edema      Electronically signed by: Chavez Frost MD  Date:    06/14/2019  Time:    18:01             Narrative:    EXAMINATION:  XR CHEST  AP PORTABLE    CLINICAL HISTORY:  Sepsis;    TECHNIQUE:  Single frontal view of the chest was performed.    COMPARISON:  03/05/2019    FINDINGS:  Previous cardiovascular surgery with valvular replacement.  Stable cardiomegaly.  Mild aortic tortuosity and atherosclerosis.    The lungs are clear.    Very small bilateral pleural effusions.                                     ED Vital Signs:  Vitals:    06/14/19 1911 06/14/19 1916 06/14/19 1926 06/14/19 1931   BP: (!) 88/52 (!) 83/50 90/60 (!) 83/58   Pulse: (!) 55 (!) 50 60 (!) 51   Resp: (!) 22 (!) 21 (!) 26    Temp:       TempSrc:       SpO2: 100% 95% 98%    Weight:        06/14/19 1933 06/14/19 1937 06/14/19 1946 06/14/19 1951   BP:  (!) 88/59 (!) 84/58 110/73   Pulse:  (!) 50 (!) 53    Resp:   16    Temp: (!) 89.7 °F (32.1 °C)      TempSrc: Rectal      SpO2:       Weight:        06/14/19 1952 06/14/19 1957 06/14/19 2006 06/14/19 2039   BP: 110/73 128/87 127/89    Pulse: (!) 55 71 67    Resp:  (!) 24 (!) 24    Temp:    (!) 90.5 °F (32.5 °C)   TempSrc:    Rectal   SpO2:       Weight:        06/14/19 2121 06/14/19 2144 06/14/19 2146   BP: 125/82  128/79   Pulse: 72  75   Resp: 16     Temp:  (!) 90.8 °F (32.7 °C)    TempSrc:  Rectal    SpO2:   100%   Weight:        Abnormal Lab Results:  Labs Reviewed   COMPREHENSIVE METABOLIC PANEL - Abnormal; Notable for the following components:       Result Value    Potassium 5.6 (*)     Glucose 145 (*)     BUN, Bld 64 (*)     Creatinine 2.9 (*)     Albumin 3.0 (*)     eGFR if  15.7 (*)     eGFR if non  13.6 (*)     All other components within normal limits   URINALYSIS, REFLEX TO URINE CULTURE - Abnormal; Notable for the following components:    Protein, UA 1+ (*)     Ketones, UA Trace (*)     Occult Blood UA Trace (*)     All other components within normal limits    Narrative:     Preferred Collection Type->Urine, Clean Catch   TSH - Abnormal; Notable for the following components:    TSH 4.302 (*)      All other components within normal limits   CBC W/ AUTO DIFFERENTIAL - Abnormal; Notable for the following components:    WBC 1.99 (*)     RBC 2.83 (*)     Hemoglobin 7.8 (*)     Hematocrit 25.7 (*)     Mean Corpuscular Hemoglobin Conc 30.4 (*)     RDW 24.1 (*)     Platelets 71 (*)     Mono% 0.0 (*)     Platelet Estimate Decreased (*)     All other components within normal limits    Narrative:        WBC critical result(s) called and verbal readback obtained from   Bonny Moore RN., 06/14/2019 19:49   URINALYSIS MICROSCOPIC - Abnormal; Notable for the following components:    Bacteria Many (*)     All other components within normal limits    Narrative:     Preferred Collection Type->Urine, Clean Catch   ISTAT PROCEDURE - Abnormal; Notable for the following components:    POC PH 7.228 (*)     POC PCO2 56.9 (*)     POC PO2 157 (*)     POC HCO3 23.7 (*)     All other components within normal limits   CULTURE, BLOOD   CULTURE, BLOOD   LACTIC ACID, PLASMA   DRUG SCREEN PANEL, URINE EMERGENCY    Narrative:     Preferred Collection Type->Urine, Clean Catch   T4, FREE   LACTIC ACID, PLASMA          Imaging Results:  Imaging Results          CT Abdomen Without Contrast (Final result)  Result time 06/14/19 21:27:10    Final result by Robert Dunham MD (06/14/19 21:27:10)                 Impression:      No bowel obstruction, intra-abdominal abscess, free fluid, or free air.    Large volume stool throughout the colon and rectum.  Prominent rectal stool measuring 7 cm transverse diameter suspicious for rectal fecal impaction.    Body wall anasarca.    Soft tissue attenuation exophytic 1.7 cm right upper pole renal structure, indeterminate. Recommend nonemergent further evaluation with renal ultrasound.      Electronically signed by: Robert Dunham  Date:    06/14/2019  Time:    21:27             Narrative:    EXAMINATION:  CT ABDOMEN WITHOUT CONTRAST    CLINICAL HISTORY:  Abdominal Pain, SIRS;    TECHNIQUE:  Low dose axial  images, sagittal and coronal reformations were obtained from the lung bases to the pubic symphysis, Oral contrast was not administered.  All CT scans at this location are performed using dose modulation techniques as appropriate to a performed exam including the following: Automated exposure control; adjustment of the mA and/or kV  according to patient size.    COMPARISON:  None    FINDINGS:  Heart: Significant cardiomegaly.    Lung Bases: Peribronchial cuffing, and linear reticular interstitial opacification with pleural thickening, likely sequela of prior episodes of pulmonary edema.    Liver: Normal in size and attenuation, with no focal hepatic lesions.    Gallbladder: Cholecystectomy clips.    Bile Ducts: No evidence of dilated ducts.    Pancreas: Moderate to severe fatty atrophy of the pancreas.    Spleen: Unremarkable.    Adrenals: Unremarkable.    Kidneys/ Ureters: Bilateral renal cysts, largest at the left lower pole measuring 4.8 cm, right upper pole 5.1 cm.  Soft tissue attenuation exophytic 1.7 cm right upper pole structure, indeterminate.  Recommend nonemergent further evaluation with renal ultrasound.    Bladder: No evidence of wall thickening.    Reproductive organs: Unremarkable.    GI Tract/Mesentery: Large volume stool throughout the colon and rectum.  Prominent rectal stool measuring 7 cm transverse diameter suspicious for rectal fecal impaction.  No evidence of appendicitis.    Peritoneal Space: No ascites. No free air.    Retroperitoneum: No significant adenopathy.    Abdominal wall: Body wall anasarca.    Vasculature: Advanced aortoiliac atherosclerotic calcification.    Bones: No acute fracture.  Lumbar facet arthropathy.  Mild grade 1 anterolisthesis L4 on L5.                               X-Ray Chest AP Portable (Final result)  Result time 06/14/19 19:40:33    Final result by Chavez Frost MD (06/14/19 19:40:33)                 Impression:      Good positioning of central venous catheter.   Negative for pneumothorax      Electronically signed by: Chavez Frost MD  Date:    06/14/2019  Time:    19:40             Narrative:    EXAMINATION:  XR CHEST AP PORTABLE    CLINICAL HISTORY:  Post Central Line Placement;    TECHNIQUE:  Single frontal view of the chest was performed.    COMPARISON:  06/14/2019    FINDINGS:  Postop changes mediastinum with previous cardiovascular surgery and valvular replacement.  Stable cardiomegaly.    Shallow inspiration with vascular congestion.  Stable thickening of the pleural stripe on the lateral right hemithorax likely indicative of pleural effusion.  Small left effusion as well.  Nodular opacity lateral to the right hilum could be summation shadow with overlapping rib though needs follow-up to exclude developing lung nodule.    Right-sided central venous catheter placement with tip at the distal SVC.                               CT Head Without Contrast (Final result)  Result time 06/14/19 18:13:16    Final result by Chavez Frost MD (06/14/19 18:13:16)                 Impression:      Negative for acute intracranial abnormality.    All CT scans at this facility are performed  using dose modulation techniques as appropriate to performed exam including the following:  automated exposure control; adjustment of mA and/or kV according to the patients size (this includes techniques or standardized protocols for targeted exams where dose is matched to indication/reason for exam: i.e. extremities or head);  iterative reconstruction technique.      Electronically signed by: Chavez Frost MD  Date:    06/14/2019  Time:    18:13             Narrative:    EXAMINATION:  CT HEAD WITHOUT CONTRAST    CLINICAL HISTORY:  Confusion/delirium, altered LOC, unexplained;    TECHNIQUE:  Axial CT images obtained throughout the head without intravenous contrast.    COMPARISON:  February 16, 2019 head CT, 11/13/2018 head CT    FINDINGS:  Negative for acute hemorrhage, mass effect, extraaxial  collection, hydrocephalus.    Stable chronic small vessel ischemic changes throughout the white matter.  Calcified plaque skull-base vasculature.    Stable moderate mucosal thickening left maxillary sinus.    The calvarium is unremarkable with no fractures.                               X-Ray Chest AP Portable (Final result)  Result time 06/14/19 18:01:37    Final result by Chavez Frost MD (06/14/19 18:01:37)                 Impression:      Stable cardiomegaly with small pleural effusions.  Negative for pulmonary edema      Electronically signed by: Chavez Frost MD  Date:    06/14/2019  Time:    18:01             Narrative:    EXAMINATION:  XR CHEST AP PORTABLE    CLINICAL HISTORY:  Sepsis;    TECHNIQUE:  Single frontal view of the chest was performed.    COMPARISON:  03/05/2019    FINDINGS:  Previous cardiovascular surgery with valvular replacement.  Stable cardiomegaly.  Mild aortic tortuosity and atherosclerosis.    The lungs are clear.    Very small bilateral pleural effusions.                                   The Emergency Provider reviewed the vital signs and test results, which are outlined above.    ED Discussions:  5:17 PM  Positive response to Narcan.  Patient is now awake and alert.     The patient was received from the off-going emergency room physician Dr Michel at 6:00PM.  All pertinent details presently available from the patient encounter were discussed along with the expected plan for disposition.      Given symptomatic bradycardia and apparent cardiogenic shock with the subsequent need for vasopressors, we have elected to place a central line for emergent access.    Patient remains pressor dependent at this point and bradycardic.  We will administer a 2nd round of glucagon given an adequate response.  Blood pressure has improved with administration of fluids and vasopressors but remains tenuous.  We have not yet identified a source of infection but given the uncertainty of that possibility  and the obvious systemic inflammatory response, we will administer broad-spectrum antibiotics with Zosyn and vancomycin pending results from further investigation.    Patient has had a modest though measurable improvement in her temperature.  The heart rate has had somewhat of an improvement with administration of the 2nd dose of glucagon.  Antibiotics have been completed.  She is currently on maintenance fluids upon completion of her IV antibiotics and bolus.  She remains lethargic but arousable.  Outside of lethargy, family report that the patient is essentially at baseline mental status.  Lactate level has dropped from 2.0 to 1.3.    All historical, clinical, radiographic, and laboratory findings were reviewed with the patient/family in detail along with the indications for transfer to an outside facility (rather than admission to our facility in [place]) secondary to patient/family preference and a need for IVF, vasopressors, cardiac monitoring, warming, and IV ABX.  All remaining questions and concerns were addressed at that time and the patient/family communicates understanding and agrees to proceed accordingly.  Similarly all pertinent details of the encounter were discussed with Dr Rivera at [facility] ED via the [facility] Super[facility] who agrees to accept the patient in transfer based on the needs/patient preferences outlined above.  Patient will be transferred by [facility] ambulance services secondary to a need for ongoing cardiac monitoring, levophed drip, and IVF en route.  Jaquan Min MD  10:20 PM                                  Clinical Impression:       ICD-10-CM ICD-9-CM   1. Cardiogenic shock R57.0 785.51   2. Weakness R53.1 780.79   3. Pancytopenia D61.818 284.19   4. Symptomatic bradycardia R00.1 427.89   5. JET (acute kidney injury) N17.9 584.9   6. Hypothermia, initial encounter T68.XXXA 991.6                                  Jaquan Min MD  06/14/19 [id number]       Jaquan Min,  MD  06/14/19 8610       Jaquan Min MD  06/15/19 6554

## 2019-06-15 LAB
POCT GLUCOSE: 205 MG/DL (ref 70–110)
TROPONIN I SERPL DL<=0.01 NG/ML-MCNC: 0.03 NG/ML (ref 0–0.03)

## 2019-06-20 LAB
BACTERIA BLD CULT: NORMAL
BACTERIA BLD CULT: NORMAL

## 2020-02-14 ENCOUNTER — LAB VISIT (OUTPATIENT)
Dept: LAB | Facility: HOSPITAL | Age: 85
End: 2020-02-14
Attending: INTERNAL MEDICINE
Payer: MEDICARE

## 2020-02-14 DIAGNOSIS — R82.90 NONSPECIFIC FINDING ON EXAMINATION OF URINE: ICD-10-CM

## 2020-02-14 DIAGNOSIS — N18.30 CHRONIC KIDNEY DISEASE, STAGE III (MODERATE): Primary | ICD-10-CM

## 2020-02-14 LAB
AMORPH CRY UR QL COMP ASSIST: ABNORMAL
BACTERIA #/AREA URNS AUTO: ABNORMAL /HPF
BILIRUB UR QL STRIP: NEGATIVE
CLARITY UR REFRACT.AUTO: ABNORMAL
COLOR UR AUTO: YELLOW
GLUCOSE UR QL STRIP: NEGATIVE
HGB UR QL STRIP: ABNORMAL
HYALINE CASTS UR QL AUTO: 0 /LPF
KETONES UR QL STRIP: NEGATIVE
LEUKOCYTE ESTERASE UR QL STRIP: ABNORMAL
MICROSCOPIC COMMENT: ABNORMAL
NITRITE UR QL STRIP: NEGATIVE
PH UR STRIP: >=9 [PH] (ref 5–8)
PROT UR QL STRIP: ABNORMAL
RBC #/AREA URNS AUTO: 2 /HPF (ref 0–4)
SP GR UR STRIP: <=1.005 (ref 1–1.03)
SQUAMOUS #/AREA URNS AUTO: 1 /HPF
TRI-PHOS CRY UR QL COMP ASSIST: ABNORMAL
URN SPEC COLLECT METH UR: ABNORMAL
UROBILINOGEN UR STRIP-ACNC: NEGATIVE EU/DL
WBC #/AREA URNS AUTO: 8 /HPF (ref 0–5)

## 2020-02-14 PROCEDURE — 87077 CULTURE AEROBIC IDENTIFY: CPT | Mod: 59

## 2020-02-14 PROCEDURE — 87186 SC STD MICRODIL/AGAR DIL: CPT

## 2020-02-14 PROCEDURE — 81000 URINALYSIS NONAUTO W/SCOPE: CPT | Mod: PO

## 2020-02-14 PROCEDURE — 87086 URINE CULTURE/COLONY COUNT: CPT

## 2020-02-14 PROCEDURE — 87088 URINE BACTERIA CULTURE: CPT

## 2020-02-17 LAB
BACTERIA UR CULT: NORMAL
BACTERIA UR CULT: NORMAL